# Patient Record
Sex: FEMALE | Race: WHITE | Employment: FULL TIME | URBAN - METROPOLITAN AREA
[De-identification: names, ages, dates, MRNs, and addresses within clinical notes are randomized per-mention and may not be internally consistent; named-entity substitution may affect disease eponyms.]

---

## 2017-10-10 ENCOUNTER — OFFICE VISIT (OUTPATIENT)
Dept: FAMILY MEDICINE | Facility: CLINIC | Age: 52
End: 2017-10-10
Payer: COMMERCIAL

## 2017-10-10 VITALS
DIASTOLIC BLOOD PRESSURE: 73 MMHG | WEIGHT: 127.4 LBS | HEART RATE: 67 BPM | TEMPERATURE: 98 F | BODY MASS INDEX: 23.45 KG/M2 | OXYGEN SATURATION: 99 % | SYSTOLIC BLOOD PRESSURE: 109 MMHG | HEIGHT: 62 IN

## 2017-10-10 DIAGNOSIS — Z12.11 SPECIAL SCREENING FOR MALIGNANT NEOPLASMS, COLON: ICD-10-CM

## 2017-10-10 DIAGNOSIS — Z23 NEED FOR PROPHYLACTIC VACCINATION AND INOCULATION AGAINST INFLUENZA: ICD-10-CM

## 2017-10-10 DIAGNOSIS — Z00.00 ROUTINE GENERAL MEDICAL EXAMINATION AT A HEALTH CARE FACILITY: Primary | ICD-10-CM

## 2017-10-10 LAB — HBA1C MFR BLD: 5.3 % (ref 4.3–6)

## 2017-10-10 PROCEDURE — 99396 PREV VISIT EST AGE 40-64: CPT | Mod: 25 | Performed by: NURSE PRACTITIONER

## 2017-10-10 PROCEDURE — 90688 IIV4 VACCINE SPLT 0.5 ML IM: CPT | Performed by: NURSE PRACTITIONER

## 2017-10-10 PROCEDURE — 83036 HEMOGLOBIN GLYCOSYLATED A1C: CPT | Performed by: NURSE PRACTITIONER

## 2017-10-10 PROCEDURE — 90471 IMMUNIZATION ADMIN: CPT | Performed by: NURSE PRACTITIONER

## 2017-10-10 PROCEDURE — 80061 LIPID PANEL: CPT | Performed by: NURSE PRACTITIONER

## 2017-10-10 PROCEDURE — 36415 COLL VENOUS BLD VENIPUNCTURE: CPT | Performed by: NURSE PRACTITIONER

## 2017-10-10 NOTE — NURSING NOTE
"Chief Complaint   Patient presents with     Physical       Initial /73 (BP Location: Left arm, Patient Position: Chair, Cuff Size: Adult Regular)  Pulse 67  Temp 98  F (36.7  C) (Oral)  Ht 5' 2\" (1.575 m)  Wt 127 lb 6.4 oz (57.8 kg)  SpO2 99%  BMI 23.3 kg/m2 Estimated body mass index is 23.3 kg/(m^2) as calculated from the following:    Height as of this encounter: 5' 2\" (1.575 m).    Weight as of this encounter: 127 lb 6.4 oz (57.8 kg).  Medication Reconciliation: complete     Coby Sykes MA      "

## 2017-10-10 NOTE — PROGRESS NOTES
Injectable Influenza Immunization Documentation    1.  Is the person to be vaccinated sick today?   No    2. Does the person to be vaccinated have an allergy to a component   of the vaccine?   No    3. Has the person to be vaccinated ever had a serious reaction   to influenza vaccine in the past?   No    4. Has the person to be vaccinated ever had Guillain-Barré syndrome?   No    Form completed by Coby Sykes MA

## 2017-10-10 NOTE — MR AVS SNAPSHOT
After Visit Summary   10/10/2017    Nhi Perla    MRN: [id number]           Patient Information     Date Of Birth          1965        Visit Information        Provider Department      10/10/2017 1:40 PM Dayana Jensen APRN [facility]        Today's Diagnoses     Routine general medical examination at a health care facility    -  1    Special screening for malignant neoplasms, colon        Need for prophylactic vaccination and inoculation against influenza          Care Instructions      Preventive Health Recommendations  Female Ages 50 - 64    Yearly exam: See your health care provider every year in order to  o Review health changes.   o Discuss preventive care.    o Review your medicines if your doctor has prescribed any.      Get a Pap test every three years (unless you have an abnormal result and your provider advises testing more often).    If you get Pap tests with HPV test, you only need to test every 5 years, unless you have an abnormal result.     You do not need a Pap test if your uterus was removed (hysterectomy) and you have not had cancer.    You should be tested each year for STDs (sexually transmitted diseases) if you're at risk.     Have a mammogram every 1 to 2 years.    Have a colonoscopy at age 50, or have a yearly FIT test (stool test). These exams screen for colon cancer.      Have a cholesterol test every 5 years, or more often if advised.    Have a diabetes test (fasting glucose) every three years. If you are at risk for diabetes, you should have this test more often.     If you are at risk for osteoporosis (brittle bone disease), think about having a bone density scan (DEXA).    Shots: Get a flu shot each year. Get a tetanus shot every 10 years.    Nutrition:     Eat at least 5 servings of fruits and vegetables each day.    Eat whole-grain bread, whole-wheat pasta and brown rice instead of white grains and rice.    Talk to your provider  about Calcium and Vitamin D.     Lifestyle    Exercise at least 150 minutes a week (30 minutes a day, 5 days a week). This will help you control your weight and prevent disease.    Limit alcohol to one drink per day.    No smoking.     Wear sunscreen to prevent skin cancer.     See your dentist every six months for an exam and cleaning.    See your eye doctor every 1 to 2 years.            Follow-ups after your visit        Future tests that were ordered for you today     Open Future Orders        Priority Expected Expires Ordered    Fecal colorectal cancer screen (FIT) Routine 10/31/2017 1/2/2018 10/10/2017            Who to contact     If you have questions or need follow up information about today's clinic visit or your schedule please contact [facility] directly at [phone].  Normal or non-critical lab and imaging results will be communicated to you by Naurexhart, letter or phone within 4 business days after the clinic has received the results. If you do not hear from us within 7 days, please contact the clinic through Naurexhart or phone. If you have a critical or abnormal lab result, we will notify you by phone as soon as possible.  Submit refill requests through JumpTheClub or call your pharmacy and they will forward the refill request to us. Please allow 3 business days for your refill to be completed.          Additional Information About Your Visit        Naurexhart Information     JumpTheClub gives you secure access to your electronic health record. If you see a primary care provider, you can also send messages to your care team and make appointments. If you have questions, please call your primary care clinic.  If you do not have a primary care provider, please call [phone] and they will assist you.        Care EveryWhere ID     This is your Care EveryWhere ID. This could be used by other organizations to access your [facility] medical records  [id number]        Your Vitals Were     Pulse  "Temperature Height Pulse Oximetry BMI (Body Mass Index)       67 98  F (36.7  C) (Oral) 5' 2\" (1.575 m) 99% 23.3 kg/m2        Blood Pressure from Last 3 Encounters:   10/10/17 109/73   09/29/16 122/73   09/12/16 116/76    Weight from Last 3 Encounters:   10/10/17 127 lb 6.4 oz (57.8 kg)   09/29/16 120 lb (54.4 kg)   09/12/16 121 lb 9.6 oz (55.2 kg)              We Performed the Following     FLU VACCINE, 3 YRS +, IM (QUADRIVALENT W/PRESERVATIVES/MULTI-DOSE) [04769]     Hemoglobin A1c     Lipid panel reflex to direct LDL     Vaccine Administration, Initial [36276]        Primary Care Provider    None Specified       No primary provider on file.        Equal Access to Services     RDONEY HEARD : Rene Rosario, bud magaña, joya cutler, anay person . So [facility] [phone].    ATENCIÓN: Si habla español, tiene a encarnacion disposición servicios gratuitos de asistencia lingüística. Karishma al [phone].    We comply with applicable federal civil rights laws and [place] laws. We do not discriminate on the basis of race, color, national origin, age, disability, sex, sexual orientation, or gender identity.            Thank you!     Thank you for choosing [facility]  for your care. Our goal is always to provide you with excellent care. Hearing back from our patients is one way we can continue to improve our services. Please take a few minutes to complete the written survey that you may receive in the mail after your visit with us. Thank you!             Your Updated Medication List - Protect others around you: Learn how to safely use, store and throw away your medicines at www.disposemymeds.org.          This list is accurate as of: 10/10/17  3:42 PM.  Always use your most recent med list.                   Brand Name Dispense Instructions for use Diagnosis    valACYclovir 1000 mg tablet    VALTREX    20 tablet    Take 1 tablet (1,000 mg) by mouth 2 " times daily    Herpes labialis

## 2017-10-10 NOTE — PROGRESS NOTES
SUBJECTIVE:   CC: Nhi Perla is an 52 year old woman who presents for preventive health visit.     Physical   Annual:     Getting at least 3 servings of Calcium per day::  Yes    Bi-annual eye exam::  Yes    Dental care twice a year::  Yes    Sleep apnea or symptoms of sleep apnea::  None    Diet::  Regular (no restrictions)    Taking medications regularly::  Yes    Medication side effects::  Not applicable    Additional concerns today::  No    Today's PHQ-2 Score:   PHQ-2 ( 1999 Pfizer) 10/10/2017   Q1: Little interest or pleasure in doing things 0   Q2: Feeling down, depressed or hopeless 0   PHQ-2 Score 0   Q1: Little interest or pleasure in doing things Not at all   Q2: Feeling down, depressed or hopeless Not at all   PHQ-2 Score 0       Abuse: Current or Past(Physical, Sexual or Emotional)- No  Do you feel safe in your environment - Yes    Social History   Substance Use Topics     Smoking status: Never Smoker     Smokeless tobacco: Never Used     Alcohol use Yes     The patient does not drink >3 drinks per day nor >7 drinks per week.    Reviewed orders with patient.  Reviewed health maintenance and updated orders accordingly - Yes    Pertinent mammograms are reviewed under the imaging tab.  History of abnormal Pap smear: NO - age 30-65 PAP every 5 years with negative HPV co-testing recommended    Reviewed and updated as needed this visit by clinical staff  Tobacco  Allergies  Meds  Med Hx  Surg Hx  Fam Hx  Soc Hx        Reviewed and updated as needed this visit by Provider  Tobacco  Allergies  Meds  Med Hx  Surg Hx  Fam Hx  Soc Hx           ROS:  C: NEGATIVE for fever, chills, change in weight  I: NEGATIVE for worrisome rashes, moles or lesions  E: NEGATIVE for vision changes or irritation  ENT: NEGATIVE for ear, mouth and throat problems  R: NEGATIVE for significant cough or SOB  B: NEGATIVE for masses, tenderness or discharge  CV: NEGATIVE for chest pain, palpitations or peripheral  "edema  GI: NEGATIVE for nausea, abdominal pain, heartburn, or change in bowel habits  : NEGATIVE for unusual urinary or vaginal symptoms. Periods are regular.  M: NEGATIVE for significant arthralgias or myalgia  N: NEGATIVE for weakness, dizziness or paresthesias  P: NEGATIVE for changes in mood or affect     OBJECTIVE:   /73 (BP Location: Left arm, Patient Position: Chair, Cuff Size: Adult Regular)  Pulse 67  Temp 98  F (36.7  C) (Oral)  Ht 5' 2\" (1.575 m)  Wt 127 lb 6.4 oz (57.8 kg)  SpO2 99%  BMI 23.3 kg/m2  EXAM:  GENERAL APPEARANCE: healthy, alert and no distress  EYES: Eyes grossly normal to inspection, PERRL and conjunctivae and sclerae normal  HENT: ear canals and TM's normal, nose and mouth without ulcers or lesions, oropharynx clear and oral mucous membranes moist  NECK: no adenopathy, no asymmetry, masses, or scars and thyroid normal to palpation  RESP: lungs clear to auscultation - no rales, rhonchi or wheezes  CV: regular rate and rhythm, normal S1 S2, no S3 or S4, no murmur, click or rub, no peripheral edema and peripheral pulses strong  ABDOMEN: soft, nontender, no hepatosplenomegaly, no masses and bowel sounds normal  MS: no musculoskeletal defects are noted and gait is age appropriate without ataxia  SKIN: no suspicious lesions or rashes  PSYCH: mentation appears normal and affect normal/bright    ASSESSMENT/PLAN:       ICD-10-CM    1. Routine general medical examination at a health care facility Z00.00 Lipid panel reflex to direct LDL     Hemoglobin A1c   2. Special screening for malignant neoplasms, colon Z12.11 Fecal colorectal cancer screen (FIT)   3. Need for prophylactic vaccination and inoculation against influenza Z23 FLU VACCINE, 3 YRS +, IM (QUADRIVALENT W/PRESERVATIVES/MULTI-DOSE) [02570]     Vaccine Administration, Initial [42710]       COUNSELING:  Reviewed preventive health counseling, as reflected in patient instructions     reports that she has never smoked. She has " "never used smokeless tobacco.  Estimated body mass index is 23.3 kg/(m^2) as calculated from the following:    Height as of this encounter: 5' 2\" (1.575 m).    Weight as of this encounter: 127 lb 6.4 oz (57.8 kg).         Counseling Resources:  ATP IV Guidelines  Pooled Cohorts Equation Calculator  Breast Cancer Risk Calculator  FRAX Risk Assessment  ICSI Preventive Guidelines  Dietary Guidelines for Americans, 2010  USDA's MyPlate  ASA Prophylaxis  Lung CA Screening    ELSA Amos CNP  [facility] for HPI/ROS submitted by the patient on 10/10/2017   PHQ-2 Score: 0    "

## 2017-10-11 LAB
CHOLEST SERPL-MCNC: 232 MG/DL
HDLC SERPL-MCNC: 112 MG/DL
LDLC SERPL CALC-MCNC: 96 MG/DL
NONHDLC SERPL-MCNC: 120 MG/DL
TRIGL SERPL-MCNC: 121 MG/DL

## 2017-10-16 ENCOUNTER — HOSPITAL ENCOUNTER (OUTPATIENT)
Dept: MAMMOGRAPHY | Facility: HOSPITAL | Age: 52
Discharge: HOME | End: 2017-10-16
Attending: OBSTETRICS & GYNECOLOGY

## 2017-10-16 DIAGNOSIS — Z12.31 VISIT FOR SCREENING MAMMOGRAM: ICD-10-CM

## 2017-11-29 PROCEDURE — 82274 ASSAY TEST FOR BLOOD FECAL: CPT | Performed by: NURSE PRACTITIONER

## 2017-12-02 DIAGNOSIS — Z12.11 SPECIAL SCREENING FOR MALIGNANT NEOPLASMS, COLON: ICD-10-CM

## 2017-12-02 LAB — HEMOCCULT STL QL IA: NEGATIVE

## 2018-05-17 ENCOUNTER — OFFICE VISIT (OUTPATIENT)
Dept: URGENT CARE | Facility: URGENT CARE | Age: 53
End: 2018-05-17
Payer: COMMERCIAL

## 2018-05-17 VITALS
WEIGHT: 122 LBS | OXYGEN SATURATION: 99 % | SYSTOLIC BLOOD PRESSURE: 121 MMHG | HEART RATE: 68 BPM | BODY MASS INDEX: 22.31 KG/M2 | DIASTOLIC BLOOD PRESSURE: 75 MMHG | TEMPERATURE: 98.4 F

## 2018-05-17 DIAGNOSIS — L30.9 DERMATITIS: Primary | ICD-10-CM

## 2018-05-17 PROCEDURE — 99214 OFFICE O/P EST MOD 30 MIN: CPT | Performed by: PHYSICIAN ASSISTANT

## 2018-05-17 RX ORDER — TRIAMCINOLONE ACETONIDE 1 MG/G
CREAM TOPICAL
Qty: 80 G | Refills: 0 | Status: SHIPPED | OUTPATIENT
Start: 2018-05-17 | End: 2023-03-30

## 2018-05-17 RX ORDER — CETIRIZINE HYDROCHLORIDE 10 MG/1
10 TABLET ORAL EVERY EVENING
Qty: 30 TABLET | Refills: 1 | Status: SHIPPED | OUTPATIENT
Start: 2018-05-17 | End: 2023-03-30

## 2018-05-17 NOTE — MR AVS SNAPSHOT
After Visit Summary   5/17/2018    Nhi Perla    MRN: [id number]           Patient Information     Date Of Birth          1965        Visit Information        Provider Department      5/17/2018 6:10 PM Thierry Aguero PA-C [facility]        Today's Diagnoses     Dermatitis    -  1       Follow-ups after your visit        Who to contact     If you have questions or need follow up information about today's clinic visit or your schedule please contact [facility] directly at [phone].  Normal or non-critical lab and imaging results will be communicated to you by Architonichart, letter or phone within 4 business days after the clinic has received the results. If you do not hear from us within 7 days, please contact the clinic through [facility] or phone. If you have a critical or abnormal lab result, we will notify you by phone as soon as possible.  Submit refill requests through Yeti Data or call your pharmacy and they will forward the refill request to us. Please allow 3 business days for your refill to be completed.          Additional Information About Your Visit        MyChart Information     Yeti Data gives you secure access to your electronic health record. If you see a primary care provider, you can also send messages to your care team and make appointments. If you have questions, please call your primary care clinic.  If you do not have a primary care provider, please call [phone] and they will assist you.        Care EveryWhere ID     This is your Care EveryWhere ID. This could be used by other organizations to access your [facility] medical records  [id number]        Your Vitals Were     Pulse Temperature Pulse Oximetry BMI (Body Mass Index)          68 98.4  F (36.9  C) (Tympanic) 99% 22.31 kg/m2         Blood Pressure from Last 3 Encounters:   05/17/18 121/75   10/10/17 109/73   09/29/16 122/73    Weight from Last 3 Encounters:   05/17/18 122  lb (55.3 kg)   10/10/17 127 lb 6.4 oz (57.8 kg)   09/29/16 120 lb (54.4 kg)              Today, you had the following     No orders found for display         Today's Medication Changes          These changes are accurate as of 5/17/18  7:08 PM.  If you have any questions, ask your nurse or doctor.               Start taking these medicines.        Dose/Directions    cetirizine 10 MG tablet   Commonly known as:  zyrTEC   Used for:  Dermatitis   Started by:  Thierry Aguero PA-C        Dose:  10 mg   Take 1 tablet (10 mg) by mouth every evening   Quantity:  30 tablet   Refills:  1       triamcinolone 0.1 % cream   Commonly known as:  KENALOG   Used for:  Dermatitis   Started by:  Thierry Aguero PA-C        Apply sparingly to affected area three times daily as needed   Quantity:  80 g   Refills:  0            Where to get your medicines      These medications were sent to [id number] - [place], [place] - [address]  [address], [place] [address]     Phone:  [phone]     cetirizine 10 MG tablet    triamcinolone 0.1 % cream                Primary Care Provider Office Phone # Fax #    Dayana Kuo ELSA Jensen [facility] [phone] [address] [facility] [address] [address]        Equal Access to Services     RODNEY HEARD AH: Hadii epifanio ku hadasho Soomaali, waaxda luqadaha, qaybta kaalmada adeegyada, anay cui. So [facility] [place] [phone].    ATENCIÓN: Si habla español, tiene a encarnacion disposición servicios gratuitos de asistencia lingüística. Karishma al [phone].    We comply with applicable federal civil rights laws and [place] laws. We do not discriminate on the basis of race, color, national origin, age, disability, sex, sexual orientation, or gender identity.            Thank you!     Thank you for choosing [facility]  for your care. Our goal is always to provide you with excellent care. Hearing back from  our patients is one way we can continue to improve our services. Please take a few minutes to complete the written survey that you may receive in the mail after your visit with us. Thank you!             Your Updated Medication List - Protect others around you: Learn how to safely use, store and throw away your medicines at www.disposemymeds.org.          This list is accurate as of 5/17/18  7:08 PM.  Always use your most recent med list.                   Brand Name Dispense Instructions for use Diagnosis    cetirizine 10 MG tablet    zyrTEC    30 tablet    Take 1 tablet (10 mg) by mouth every evening    Dermatitis       MULTIVITAMIN GUMMIES WOMENS PO           triamcinolone 0.1 % cream    KENALOG    80 g    Apply sparingly to affected area three times daily as needed    Dermatitis       valACYclovir 1000 mg tablet    VALTREX    20 tablet    Take 1 tablet (1,000 mg) by mouth 2 times daily    Herpes labialis

## 2018-05-17 NOTE — PROGRESS NOTES
SUBJECTIVE:  Nhi Perla is a 52 year old female who presents to the clinic today for a rash.  Onset of rash was 3 week(s) ago.   Rash is worsening and intermittent episodes lasting  1  week(s).  Location of the rash: chest today, neck 1 week ago and left hip 1 week ago. She did see her dermatologist 3 weeks ago for forehead rash that was similar. HC 1% used and it did resolve.  Quality/symptoms of rash: itching and red   Symptoms are moderate and rash seems to be worsening.  Previous history of a similar rash? No  Recent exposure history: none known    Associated symptoms include: nothing.    No past medical history on file.  Current Outpatient Prescriptions   Medication Sig Dispense Refill     Multiple Vitamins-Minerals (MULTIVITAMIN GUMMIES WOMENS PO)        valACYclovir (VALTREX) 1000 mg tablet Take 1 tablet (1,000 mg) by mouth 2 times daily (Patient not taking: Reported on 5/17/2018) 20 tablet 0     Social History   Substance Use Topics     Smoking status: Never Smoker     Smokeless tobacco: Never Used     Alcohol use Yes       ROS:  CONSTITUTIONAL:NEGATIVE for fever, chills, change in weight  INTEGUMENTARY/SKIN: POSITIVE for rash neck    EXAM:   /75  Pulse 68  Temp 98.4  F (36.9  C) (Tympanic)  Wt 122 lb (55.3 kg)  SpO2 99%  BMI 22.31 kg/m2  GENERAL: alert, no acute distress.  SKIN: Rash description:    Distribution: localized  Location: chest upper right, lower leg left and neck left   Color: red and skin color,  Lesion type: maculopapular, confluent with excoriation  GENERAL APPEARANCE: healthy, alert and no distress    ASSESSMENT:    1. Dermatitis  R/O contact  - cetirizine (ZYRTEC) 10 MG tablet; Take 1 tablet (10 mg) by mouth every evening  Dispense: 30 tablet; Refill: 1  - triamcinolone (KENALOG) 0.1 % cream; Apply sparingly to affected area three times daily as needed  Dispense: 80 g; Refill: 0    PLAN: also diphenhydramine at night 25 mg.   1) See today's orders.  2) Follow-up with  dermatologist next week.

## 2019-08-12 ENCOUNTER — OFFICE VISIT (OUTPATIENT)
Dept: FAMILY MEDICINE | Facility: CLINIC | Age: 54
End: 2019-08-12
Payer: COMMERCIAL

## 2019-08-12 VITALS
BODY MASS INDEX: 22.68 KG/M2 | SYSTOLIC BLOOD PRESSURE: 96 MMHG | HEART RATE: 67 BPM | TEMPERATURE: 97.9 F | RESPIRATION RATE: 18 BRPM | WEIGHT: 124 LBS | DIASTOLIC BLOOD PRESSURE: 60 MMHG | OXYGEN SATURATION: 100 %

## 2019-08-12 DIAGNOSIS — R05.9 COUGH: Primary | ICD-10-CM

## 2019-08-12 PROCEDURE — 99213 OFFICE O/P EST LOW 20 MIN: CPT | Performed by: NURSE PRACTITIONER

## 2019-08-12 RX ORDER — AZITHROMYCIN 250 MG/1
TABLET, FILM COATED ORAL
Qty: 6 TABLET | Refills: 0 | Status: SHIPPED | OUTPATIENT
Start: 2019-08-12 | End: 2023-03-30

## 2019-08-12 NOTE — PROGRESS NOTES
Subjective     Nhi Perla is a 54 year old female who presents to clinic today for the following health issues:    HPI   RESPIRATORY SYMPTOMS      Duration: x2-3 months     Description  cough    Severity: mild    Accompanying signs and symptoms: worse at night and morning. Coughing fits randomly during the day      History (predisposing factors):  none    Precipitating or alleviating factors: None    Therapies tried and outcome:  Zyrtec and Calritan  Outcome not effective     Cough persists for 2-3 months.  Nonproductive.    Random during day more persistent at night  Was told to try antihistamine and zyrtec does not seem to change the cough.    Denies fevers or chills.    Has not tried antibiotics  OTC cough meds have not given any extended relief.      Reviewed and updated as needed this visit by Provider  Tobacco  Allergies  Meds  Problems  Med Hx  Surg Hx  Fam Hx         Review of Systems   ROS COMP: Constitutional, HEENT, cardiovascular, pulmonary, GI, , musculoskeletal, neuro, skin, endocrine and psych systems are negative, except as otherwise noted.      Objective    BP 96/60 (BP Location: Right arm, Patient Position: Sitting, Cuff Size: Adult Regular)   Pulse 67   Temp 97.9  F (36.6  C) (Oral)   Resp 18   Wt 56.2 kg (124 lb)   SpO2 100%   BMI 22.68 kg/m    Body mass index is 22.68 kg/m .  Physical Exam   GENERAL: healthy, alert and no distress  EYES: Eyes grossly normal to inspection, PERRL and conjunctivae and sclerae normal  HENT: ear canals and TM's normal, nose and mouth without ulcers or lesions  NECK: no adenopathy, no asymmetry, masses, or scars and thyroid normal to palpation  RESP: lungs clear to auscultation - no rales, rhonchi or wheezes  CV: regular rate and rhythm, normal S1 S2, no S3 or S4, no murmur, click or rub, no peripheral edema and peripheral pulses strong  MS: no gross musculoskeletal defects noted, no edema  SKIN: no suspicious lesions or rashes          Assessment &  Plan       ICD-10-CM    1. Cough R05 azithromycin (ZITHROMAX) 250 MG tablet     albuterol (PROAIR RESPICLICK) 108 (90 Base) MCG/ACT inhaler     Will trial zpak and proair.  If ineffective may consider labs and XR.         Return in about 1 week (around 8/19/2019), or if symptoms worsen or fail to improve.    ELSA Amos CNP  [facility]

## 2019-11-12 ENCOUNTER — TRANSFERRED RECORDS (OUTPATIENT)
Dept: HEALTH INFORMATION MANAGEMENT | Facility: CLINIC | Age: 54
End: 2019-11-12

## 2020-03-01 ENCOUNTER — HEALTH MAINTENANCE LETTER (OUTPATIENT)
Age: 55
End: 2020-03-01

## 2020-12-14 ENCOUNTER — HEALTH MAINTENANCE LETTER (OUTPATIENT)
Age: 55
End: 2020-12-14

## 2021-04-17 ENCOUNTER — HEALTH MAINTENANCE LETTER (OUTPATIENT)
Age: 56
End: 2021-04-17

## 2021-04-22 ENCOUNTER — IMMUNIZATION (OUTPATIENT)
Dept: NURSING | Facility: CLINIC | Age: 56
End: 2021-04-22
Payer: COMMERCIAL

## 2021-04-22 PROCEDURE — 91300 PR COVID VAC PFIZER DIL RECON 30 MCG/0.3 ML IM: CPT

## 2021-04-22 PROCEDURE — 0001A PR COVID VAC PFIZER DIL RECON 30 MCG/0.3 ML IM: CPT

## 2021-05-13 ENCOUNTER — IMMUNIZATION (OUTPATIENT)
Dept: NURSING | Facility: CLINIC | Age: 56
End: 2021-05-13
Attending: INTERNAL MEDICINE
Payer: COMMERCIAL

## 2021-05-13 PROCEDURE — 0002A PR COVID VAC PFIZER DIL RECON 30 MCG/0.3 ML IM: CPT

## 2021-05-13 PROCEDURE — 91300 PR COVID VAC PFIZER DIL RECON 30 MCG/0.3 ML IM: CPT

## 2021-10-02 ENCOUNTER — HEALTH MAINTENANCE LETTER (OUTPATIENT)
Age: 56
End: 2021-10-02

## 2022-01-17 ENCOUNTER — OFFICE VISIT (OUTPATIENT)
Dept: FAMILY MEDICINE | Facility: CLINIC | Age: 57
End: 2022-01-17
Payer: COMMERCIAL

## 2022-01-17 VITALS
DIASTOLIC BLOOD PRESSURE: 70 MMHG | WEIGHT: 127 LBS | HEART RATE: 64 BPM | HEIGHT: 62 IN | TEMPERATURE: 98.4 F | BODY MASS INDEX: 23.37 KG/M2 | RESPIRATION RATE: 16 BRPM | SYSTOLIC BLOOD PRESSURE: 108 MMHG | OXYGEN SATURATION: 98 %

## 2022-01-17 DIAGNOSIS — J40 BRONCHITIS: Primary | ICD-10-CM

## 2022-01-17 PROCEDURE — 99213 OFFICE O/P EST LOW 20 MIN: CPT | Performed by: NURSE PRACTITIONER

## 2022-01-17 RX ORDER — ALBUTEROL SULFATE 90 UG/1
2 AEROSOL, METERED RESPIRATORY (INHALATION) EVERY 6 HOURS
Qty: 18 G | Refills: 0 | Status: SHIPPED | OUTPATIENT
Start: 2022-01-17 | End: 2023-03-30

## 2022-01-17 RX ORDER — PREDNISONE 20 MG/1
20 TABLET ORAL DAILY
Qty: 10 TABLET | Refills: 0 | Status: SHIPPED | OUTPATIENT
Start: 2022-01-17 | End: 2023-03-30

## 2022-01-17 RX ORDER — BENZONATATE 200 MG/1
200 CAPSULE ORAL 3 TIMES DAILY PRN
Qty: 30 CAPSULE | Refills: 0 | Status: SHIPPED | OUTPATIENT
Start: 2022-01-17 | End: 2023-03-30

## 2022-01-17 ASSESSMENT — MIFFLIN-ST. JEOR: SCORE: 1124.32

## 2022-01-17 NOTE — PROGRESS NOTES
Assessment & Plan     Bronchitis  Symptoms most consistent with acute bronchitis.  Will try short course of steroids and inhaler, cough medication prescribed.   - benzonatate (TESSALON) 200 MG capsule; Take 1 capsule (200 mg) by mouth 3 times daily as needed for cough  - albuterol (PROAIR HFA/PROVENTIL HFA/VENTOLIN HFA) 108 (90 Base) MCG/ACT inhaler; Inhale 2 puffs into the lungs every 6 hours  - predniSONE (DELTASONE) 20 MG tablet; Take 1 tablet (20 mg) by mouth daily        Return for Follow up if symptoms worsen or fail to improve, Follow up, Routine preventive, in person.    ELSA [facility]    Adrienne Hankins is a 56 year old who presents for the following health issues     HPI     Acute Illness  Acute illness concerns: cough that can be uncontrollable   Onset/Duration: November 2021  Symptoms:  Fever: no  Chills/Sweats: no  Headache (location?): no  Sinus Pressure: no  Conjunctivitis:  no  Ear Pain: no  Rhinorrhea: no  Congestion: no  Sore Throat: no  Cough: YES-non-productive, productive of clear sputum, barking  Wheeze: YES  Decreased Appetite: no  Nausea: no  Vomiting: no  Diarrhea: no  Dysuria/Freq.: no  Dysuria or Hematuria: no  Fatigue/Achiness: no  Sick/Strep Exposure: no  Therapies tried and outcome: nyquil and cough drops and medicine somewhat helps    Everyday, having hacking coughing fits.  Has taking OTC cough suppressants.  Bad cold cough in Nov.  Dry cough.  Lungs hurt after coughing fits.     Review of Systems   Constitutional, HEENT, cardiovascular, pulmonary, gi and gu systems are negative, except as otherwise noted.      Objective    There were no vitals taken for this visit.  There is no height or weight on file to calculate BMI.  Physical Exam   GENERAL: healthy, alert and no distress  EYES: Eyes grossly normal to inspection, PERRL and conjunctivae and sclerae normal  HENT: ear canals and TM's normal, nose and mouth without ulcers or  lesions  NECK: no adenopathy, no asymmetry, masses, or scars and thyroid normal to palpation  RESP: lungs clear to auscultation - no rales, rhonchi or wheezes  CV: regular rate and rhythm, normal S1 S2, no S3 or S4, no murmur, click or rub, no peripheral edema and peripheral pulses strong  MS: no gross musculoskeletal defects noted, no edema

## 2022-02-11 ENCOUNTER — DOCUMENTATION ONLY (OUTPATIENT)
Dept: LAB | Facility: CLINIC | Age: 57
End: 2022-02-11
Payer: COMMERCIAL

## 2022-02-11 NOTE — PROGRESS NOTES
Please note, never seen her  Will order labs in person at apt with me which is my preference as gives me opportunity to discuss hx and order appropriately

## 2022-02-11 NOTE — PROGRESS NOTES
Nhi Perla has an upcoming lab appointment:    Future Appointments   Date Time Provider Department Center   3/3/2022  8:15 AM [facility]    3/7/2022  3:10 PM Little Muir MD [facility]     Patient is scheduled for the following lab(s): PER PATIENT APPOINTMENT NOTES, PHYSICAL LABS    There is no order available. Please review and place either future orders or HMPO (Review of Health Maintenance Protocol Orders), as appropriate.    Health Maintenance Due   Topic     ANNUAL REVIEW OF HM ORDERS      HIV SCREENING      HEPATITIS C SCREENING      Valarie Colón

## 2022-02-22 NOTE — PROGRESS NOTES
NexPlanart message sent to patient to cancel her 3/3/22 lab appt as labs will be drawn at 3/7/22 appointment with Dr. Muir.    Valarie Upton RN  [facility]

## 2022-05-14 ENCOUNTER — HEALTH MAINTENANCE LETTER (OUTPATIENT)
Age: 57
End: 2022-05-14

## 2023-01-14 ENCOUNTER — HEALTH MAINTENANCE LETTER (OUTPATIENT)
Age: 58
End: 2023-01-14

## 2023-03-30 ENCOUNTER — OFFICE VISIT (OUTPATIENT)
Dept: FAMILY MEDICINE | Facility: CLINIC | Age: 58
End: 2023-03-30
Payer: COMMERCIAL

## 2023-03-30 VITALS
SYSTOLIC BLOOD PRESSURE: 102 MMHG | WEIGHT: 128.8 LBS | DIASTOLIC BLOOD PRESSURE: 70 MMHG | HEIGHT: 62 IN | HEART RATE: 75 BPM | OXYGEN SATURATION: 98 % | BODY MASS INDEX: 23.7 KG/M2 | RESPIRATION RATE: 17 BRPM | TEMPERATURE: 98.5 F

## 2023-03-30 DIAGNOSIS — Z87.442 HISTORY OF KIDNEY STONES: ICD-10-CM

## 2023-03-30 DIAGNOSIS — Z13.29 SCREENING FOR THYROID DISORDER: ICD-10-CM

## 2023-03-30 DIAGNOSIS — R06.02 SHORTNESS OF BREATH: ICD-10-CM

## 2023-03-30 DIAGNOSIS — Z00.00 ROUTINE HISTORY AND PHYSICAL EXAMINATION OF ADULT: Primary | ICD-10-CM

## 2023-03-30 DIAGNOSIS — Z71.89 ADVANCED DIRECTIVES, COUNSELING/DISCUSSION: ICD-10-CM

## 2023-03-30 DIAGNOSIS — Z23 NEED FOR HEPATITIS B VACCINATION: ICD-10-CM

## 2023-03-30 DIAGNOSIS — Z85.828 HISTORY OF BASAL CELL CARCINOMA: ICD-10-CM

## 2023-03-30 DIAGNOSIS — Z12.11 COLON CANCER SCREENING: ICD-10-CM

## 2023-03-30 DIAGNOSIS — Z00.00 HEALTH CARE MAINTENANCE: ICD-10-CM

## 2023-03-30 DIAGNOSIS — Z13.0 SCREENING, ANEMIA, DEFICIENCY, IRON: ICD-10-CM

## 2023-03-30 DIAGNOSIS — K57.30 DIVERTICULOSIS OF LARGE INTESTINE WITHOUT HEMORRHAGE: ICD-10-CM

## 2023-03-30 DIAGNOSIS — Z23 NEED FOR PROPHYLACTIC VACCINATION AND INOCULATION AGAINST INFLUENZA: ICD-10-CM

## 2023-03-30 DIAGNOSIS — Z80.3 FAMILY HISTORY OF MALIGNANT NEOPLASM OF BREAST: ICD-10-CM

## 2023-03-30 DIAGNOSIS — Z53.20 HIV SCREENING DECLINED: ICD-10-CM

## 2023-03-30 DIAGNOSIS — Z87.891 FORMER SMOKER: ICD-10-CM

## 2023-03-30 DIAGNOSIS — Z11.59 NEED FOR HEPATITIS C SCREENING TEST: ICD-10-CM

## 2023-03-30 DIAGNOSIS — K64.9 HEMORRHOIDS, UNSPECIFIED HEMORRHOID TYPE: ICD-10-CM

## 2023-03-30 DIAGNOSIS — Z23 NEED FOR SHINGLES VACCINE: ICD-10-CM

## 2023-03-30 DIAGNOSIS — Z82.49 FAMILY HISTORY OF ISCHEMIC HEART DISEASE: ICD-10-CM

## 2023-03-30 DIAGNOSIS — Z13.1 SCREENING FOR DIABETES MELLITUS: ICD-10-CM

## 2023-03-30 DIAGNOSIS — Z82.62 FAMILY HISTORY OF OSTEOPOROSIS: ICD-10-CM

## 2023-03-30 DIAGNOSIS — Z13.220 ENCOUNTER FOR LIPID SCREENING FOR CARDIOVASCULAR DISEASE: ICD-10-CM

## 2023-03-30 DIAGNOSIS — Z23 NEED FOR COVID-19 VACCINE: ICD-10-CM

## 2023-03-30 DIAGNOSIS — D22.9 MULTIPLE PIGMENTED NEVI: ICD-10-CM

## 2023-03-30 DIAGNOSIS — Z86.018 HISTORY OF UTERINE FIBROID: ICD-10-CM

## 2023-03-30 DIAGNOSIS — Z79.890 HORMONE REPLACEMENT THERAPY (POSTMENOPAUSAL): ICD-10-CM

## 2023-03-30 DIAGNOSIS — Z13.6 ENCOUNTER FOR LIPID SCREENING FOR CARDIOVASCULAR DISEASE: ICD-10-CM

## 2023-03-30 PROBLEM — N93.8 DYSFUNCTIONAL UTERINE BLEEDING: Status: ACTIVE | Noted: 2023-03-30

## 2023-03-30 PROBLEM — N93.8 DYSFUNCTIONAL UTERINE BLEEDING: Status: RESOLVED | Noted: 2023-03-30 | Resolved: 2023-03-30

## 2023-03-30 LAB
ALBUMIN SERPL BCG-MCNC: 4.2 G/DL (ref 3.5–5.2)
ALP SERPL-CCNC: 51 U/L (ref 35–104)
ALT SERPL W P-5'-P-CCNC: 11 U/L (ref 10–35)
ANION GAP SERPL CALCULATED.3IONS-SCNC: 11 MMOL/L (ref 7–15)
AST SERPL W P-5'-P-CCNC: 24 U/L (ref 10–35)
BASOPHILS # BLD AUTO: 0 10E3/UL (ref 0–0.2)
BASOPHILS NFR BLD AUTO: 1 %
BILIRUB SERPL-MCNC: 0.6 MG/DL
BUN SERPL-MCNC: 15.8 MG/DL (ref 6–20)
CALCIUM SERPL-MCNC: 8.9 MG/DL (ref 8.6–10)
CHLORIDE SERPL-SCNC: 105 MMOL/L (ref 98–107)
CHOLEST SERPL-MCNC: 241 MG/DL
CREAT SERPL-MCNC: 0.71 MG/DL (ref 0.51–0.95)
DEPRECATED HCO3 PLAS-SCNC: 24 MMOL/L (ref 22–29)
EOSINOPHIL # BLD AUTO: 0.1 10E3/UL (ref 0–0.7)
EOSINOPHIL NFR BLD AUTO: 3 %
ERYTHROCYTE [DISTWIDTH] IN BLOOD BY AUTOMATED COUNT: 12.1 % (ref 10–15)
GFR SERPL CREATININE-BSD FRML MDRD: >90 ML/MIN/1.73M2
GLUCOSE SERPL-MCNC: 87 MG/DL (ref 70–99)
HBA1C MFR BLD: 5.6 % (ref 0–5.6)
HCT VFR BLD AUTO: 38.6 % (ref 35–47)
HCV AB SERPL QL IA: NONREACTIVE
HDLC SERPL-MCNC: 92 MG/DL
HGB BLD-MCNC: 12.8 G/DL (ref 11.7–15.7)
IMM GRANULOCYTES # BLD: 0 10E3/UL
IMM GRANULOCYTES NFR BLD: 0 %
LDLC SERPL CALC-MCNC: 136 MG/DL
LYMPHOCYTES # BLD AUTO: 1.4 10E3/UL (ref 0.8–5.3)
LYMPHOCYTES NFR BLD AUTO: 33 %
MCH RBC QN AUTO: 33.4 PG (ref 26.5–33)
MCHC RBC AUTO-ENTMCNC: 33.2 G/DL (ref 31.5–36.5)
MCV RBC AUTO: 101 FL (ref 78–100)
MONOCYTES # BLD AUTO: 0.4 10E3/UL (ref 0–1.3)
MONOCYTES NFR BLD AUTO: 10 %
NEUTROPHILS # BLD AUTO: 2.3 10E3/UL (ref 1.6–8.3)
NEUTROPHILS NFR BLD AUTO: 53 %
NONHDLC SERPL-MCNC: 149 MG/DL
PLATELET # BLD AUTO: 338 10E3/UL (ref 150–450)
POTASSIUM SERPL-SCNC: 3.8 MMOL/L (ref 3.4–5.3)
PROT SERPL-MCNC: 6.9 G/DL (ref 6.4–8.3)
RBC # BLD AUTO: 3.83 10E6/UL (ref 3.8–5.2)
SODIUM SERPL-SCNC: 140 MMOL/L (ref 136–145)
TRIGL SERPL-MCNC: 65 MG/DL
TSH SERPL DL<=0.005 MIU/L-ACNC: 2.18 UIU/ML (ref 0.3–4.2)
WBC # BLD AUTO: 4.3 10E3/UL (ref 4–11)

## 2023-03-30 PROCEDURE — 80061 LIPID PANEL: CPT | Performed by: FAMILY MEDICINE

## 2023-03-30 PROCEDURE — 99396 PREV VISIT EST AGE 40-64: CPT | Mod: 25 | Performed by: FAMILY MEDICINE

## 2023-03-30 PROCEDURE — 80050 GENERAL HEALTH PANEL: CPT | Performed by: FAMILY MEDICINE

## 2023-03-30 PROCEDURE — 36415 COLL VENOUS BLD VENIPUNCTURE: CPT | Performed by: FAMILY MEDICINE

## 2023-03-30 PROCEDURE — 86803 HEPATITIS C AB TEST: CPT | Performed by: FAMILY MEDICINE

## 2023-03-30 PROCEDURE — 83036 HEMOGLOBIN GLYCOSYLATED A1C: CPT | Performed by: FAMILY MEDICINE

## 2023-03-30 PROCEDURE — 99213 OFFICE O/P EST LOW 20 MIN: CPT | Mod: 25 | Performed by: FAMILY MEDICINE

## 2023-03-30 RX ORDER — PROGESTERONE 100 MG/1
100 CAPSULE ORAL AT BEDTIME
COMMUNITY
Start: 2023-02-13

## 2023-03-30 RX ORDER — ESTRADIOL 0.04 MG/D
PATCH, EXTENDED RELEASE TRANSDERMAL
COMMUNITY
Start: 2023-03-13

## 2023-03-30 ASSESSMENT — ENCOUNTER SYMPTOMS
FREQUENCY: 0
PARESTHESIAS: 0
ABDOMINAL PAIN: 0
EYE PAIN: 0
SORE THROAT: 0
CONSTIPATION: 0
PALPITATIONS: 0
HEARTBURN: 0
BREAST MASS: 0
WEAKNESS: 0
NAUSEA: 0
JOINT SWELLING: 0
HEADACHES: 0
SHORTNESS OF BREATH: 0
COUGH: 0
ARTHRALGIAS: 0
NERVOUS/ANXIOUS: 0
DIARRHEA: 0
CHILLS: 0
DYSURIA: 0
MYALGIAS: 0
DIZZINESS: 0
HEMATURIA: 0
HEMATOCHEZIA: 0
FEVER: 0

## 2023-03-30 NOTE — PROGRESS NOTES
SUBJECTIVE:   CC: Nhi is an 57 year old who presents for preventive health visit.   No flowsheet data found.Patient has been advised of split billing requirements and indicates understanding: Yes  Healthy Habits:     Getting at least 3 servings of Calcium per day:  Yes    Bi-annual eye exam:  Yes    Dental care twice a year:  Yes    Sleep apnea or symptoms of sleep apnea:  None    Diet:  Regular (no restrictions)    Frequency of exercise:  4-5 days/week    Duration of exercise:  45-60 minutes    Taking medications regularly:  Yes    PHQ-2 Total Score: 0    Additional concerns today:  Yes    57-year-old lady,  Former smoker ,with history of allergy to penicillin, history of DU B, on Vivelle-Dot and Prometrium by an outside provider, seen only for acute care previously last by Adelita Barragan 1/17/2022 for bronchitis and treated with Tessalon prednisone and azithromycin.  Colonoscopy last done 2019 showed diverticulosis hemorrhoids polyps removed were benign tissue and recheck due in 2029,    Here today for a preventive physical.  New to this provider.    On hrt by NP  Post menopausal 2.5 yrs  On HRT on Vivelle patch &  Prometrium cap daily since fall 2022 : emotional , couldn't sleep, incontinence with exercise improved, seen by nurse pract [facility] . Regular ob is Dr Benton  To get mammogram this yr with then  Pap done 1 yr ago was reported  normal  Fh of breast cancer  Works with ob on this  Opted out genetic consult, one aunt was in 60's overweight other aunt was a heavy smoker and drinker   No breast concerns    Gets shortness of breath noted, not last long, sometimes in am, usually when relaxed, like sleeping and get up, or start of exercise notices as well  Exercise a lot , does [place] therapy twice a week and tennis 3 times a week with no shortness of breath or chest pain or palpitations through activity.  Tries to eat healthy, No processed foods  Parents both had heart disease, see if can do a heart  screening. Dad had a triple bypass age 58, paternal hx of strokes and heart disease and mom had a heart attack age 80  Thinks stress induced with mom  Former smoker quit 2000 0.25 cig a day 16 yrs    Colonoscopy due 2029  asymptomatic diverticulosis and hemorrhoids    Hx of kidney stones unknown, last time in [facility], 10 yrs ago, unknown kind    Sister osteoporosis  dexa scan done 3 yrs ago told fine,   [employer], 2 / week  And tennis 3/ wk , walks    Hx of uterine fibroid s/p surgery     Multiple nevi, hx of bccc face s/p mohs, follows yearly with derm consultants     reviewed  Has ACP at home  Opted out flu shot this flu season  Opt out COVID booster, had 3 primary, not like lot of med  Discussed hep B vaccination, will think about it   Consider Shingrex. shingles vaccines a series of 2 shots 2 to 6 months apart that can cause couple days of flu like symptoms but is expensive so to check with insurance and get at pharmacy if cheaper there.  Labs today   Same partner since 1998  Did IVF in past   Tested in past , declines HIV  Feels not at risk for HIV  Will do hep C    Today's PHQ-2 Score:   PHQ-2 ( 1999 Pfizer) 3/30/2023   Q1: Little interest or pleasure in doing things 0   Q2: Feeling down, depressed or hopeless 0   PHQ-2 Score 0   Q1: Little interest or pleasure in doing things Not at all   Q2: Feeling down, depressed or hopeless Not at all   PHQ-2 Score 0     Social History     Tobacco Use     Smoking status: Never     Smokeless tobacco: Never   Substance Use Topics     Alcohol use: Yes       Alcohol Use 3/30/2023   Prescreen: >3 drinks/day or >7 drinks/week? No     Reviewed orders with patient.  Reviewed health maintenance and updated orders accordingly - Yes  Lab work is in process  Labs reviewed in EPIC  BP Readings from Last 3 Encounters:   03/30/23 102/70   01/17/22 108/70   08/12/19 96/60    Wt Readings from Last 3 Encounters:   03/30/23 58.4 kg (128 lb 12.8 oz)   01/17/22 57.6 kg (127 lb)    19 56.2 kg (124 lb)          Patient Active Problem List   Diagnosis     Diverticulosis of large intestine without hemorrhage     Hemorrhoids, unspecified hemorrhoid type     Family history of osteoporosis     Former smoker     Family history of ischemic heart disease     Hormone replacement therapy (postmenopausal)     History of kidney stones     Past Surgical History:   Procedure Laterality Date     AS KNEE SCOPE,MED/LAT MENISCUS REPAIR Right 2008    meniscus     ROTATOR CUFF REPAIR RT/LT Right 2013     UTERINE FIBROID SURGERY         Social History     Tobacco Use     Smoking status: Former     Packs/day: 0.25     Years: 16.00     Pack years: 4.00     Types: Cigarettes     Start date: 1984     Quit date: 2000     Years since quittin.2     Smokeless tobacco: Never   Substance Use Topics     Alcohol use: Yes     Comment: about 3 to 4 glasses a week     Family History   Problem Relation Age of Onset     Heart Disease Mother          80 mi     Heart Disease Father      CABG Father 58     Hyperlipidemia Father      Osteoporosis Sister      Cerebrovascular Disease Paternal Grandmother      Heart Disease Paternal Grandmother      Breast Cancer Maternal Aunt      Breast Cancer Paternal Aunt          Current Outpatient Medications   Medication Sig Dispense Refill     estradiol (VIVELLE-DOT) 0.0375 MG/24HR BIW patch APPLY 1 PATCH TRANSDERMALLY 2 TIMES WEEKLY       Multiple Vitamins-Minerals (MULTIVITAMIN ADULTS PO)        progesterone (PROMETRIUM) 100 MG capsule Take 100 mg by mouth At Bedtime       Allergies   Allergen Reactions     Pcn [Penicillin G]      hives     Recent Labs   Lab Test 10/10/17  1422   A1C 5.3   LDL 96      TRIG 121        Breast Cancer Screening:    Breast CA Risk Assessment (FHS-7) 3/30/2023   Do you have a family history of breast, colon, or ovarian cancer? No / Unknown     Mammogram Screening: Recommended mammography every 1-2 years with patient discussion and risk  factor consideration  Pertinent mammograms are reviewed under the imaging tab.    History of abnormal Pap smear:   NO - age 30-65 PAP every 5 years with negative HPV co-testing recommended  Last 3 Pap and HPV Results:   PAP / HPV Latest Ref Rng & Units 2016   PAP (Historical) Negative Negative     PAP / HPV Latest Ref Rng & Units 2016   PAP (Historical) Negative Negative     Reviewed and updated as needed this visit by clinical staff    Allergies  Meds  Problems  Med Hx            Reviewed and updated as needed this visit by Provider    Allergies  Meds  Problems  Med Hx           Past Medical History:   Diagnosis Date     CARDIOVASCULAR SCREENING; LDL GOAL LESS THAN 160 2013     Dysfunctional uterine bleeding 2023     Encounter for artificial insemination     with own partner     History of in vitro fertilization     x 1     History of kidney stones       Past Surgical History:   Procedure Laterality Date     AS KNEE SCOPE,MED/LAT MENISCUS REPAIR Right 2008    meniscus     ROTATOR CUFF REPAIR RT/LT Right 2013     UTERINE FIBROID SURGERY       OB History    Para Term  AB Living   0 0 0 0 0 0   SAB IAB Ectopic Multiple Live Births   0 0 0 0 0       Review of Systems   Constitutional: Negative for chills and fever.   HENT: Negative for congestion, ear pain, hearing loss and sore throat.    Eyes: Negative for pain and visual disturbance.   Respiratory: Negative for cough and shortness of breath.    Cardiovascular: Negative for chest pain, palpitations and peripheral edema.   Gastrointestinal: Negative for abdominal pain, constipation, diarrhea, heartburn, hematochezia and nausea.   Breasts:  Negative for tenderness, breast mass and discharge.   Genitourinary: Negative for dysuria, frequency, genital sores, hematuria, pelvic pain, urgency, vaginal bleeding and vaginal discharge.   Musculoskeletal: Negative for arthralgias, joint swelling and myalgias.   Skin: Negative for rash.  "  Neurological: Negative for dizziness, weakness, headaches and paresthesias.   Psychiatric/Behavioral: Negative for mood changes. The patient is not nervous/anxious.       OBJECTIVE:   /70 (BP Location: Right arm, Patient Position: Sitting, Cuff Size: Adult Regular)   Pulse 75   Temp 98.5  F (36.9  C) (Temporal)   Resp 17   Ht 1.585 m (5' 2.4\")   Wt 58.4 kg (128 lb 12.8 oz)   SpO2 98%   BMI 23.26 kg/m    Physical Exam  GENERAL: healthy, alert and no distress  EYES: Eyes grossly normal to inspection, PERRL and conjunctivae and sclerae normal  HENT: ear canals and TM's normal, nose and mouth without ulcers or lesions  NECK: no adenopathy, no asymmetry, masses, or scars and thyroid normal to palpation  RESP: lungs clear to auscultation - no rales, rhonchi or wheezes  CV: regular rate and rhythm, normal S1 S2, no S3 or S4, no murmur, click or rub, no peripheral edema and peripheral pulses strong  ABDOMEN: soft, non tender, no hepatosplenomegaly, no masses and bowel sounds normal  MS: no gross musculoskeletal defects noted, no edema  SKIN: multiple pigmented nevi  NEURO: Normal strength and tone, mentation intact and speech normal  PSYCH: mentation appears normal, affect normal/bright    Diagnostic Test Results:  Labs reviewed in Epic  Results for orders placed or performed in visit on 03/30/23 (from the past 24 hour(s))   CBC with platelets and differential    Narrative    The following orders were created for panel order CBC with platelets and differential.  Procedure                               Abnormality         Status                     ---------                               -----------         ------                     CBC with platelets and d...[[id number]]                                                   Please view results for these tests on the individual orders.     Results for orders placed or performed in visit on 03/30/23   CBC with platelets and differential     Status: None ()    " Narrative    The following orders were created for panel order CBC with platelets and differential.  Procedure                               Abnormality         Status                     ---------                               -----------         ------                     CBC with platelets and d...[[id number]]                                                   Please view results for these tests on the individual orders.       ASSESSMENT/PLAN:       ICD-10-CM    1. Routine history and physical examination of adult  Z00.00 Comprehensive metabolic panel (BMP + Alb, Alk Phos, ALT, AST, Total. Bili, TP)     Lipid Profile (Chol, Trig, HDL, LDL calc)     TSH with free T4 reflex     Hemoglobin A1c     Hepatitis C Screen Reflex to HCV RNA Quant and Genotype     CBC with platelets and differential     Comprehensive metabolic panel (BMP + Alb, Alk Phos, ALT, AST, Total. Bili, TP)     Lipid Profile (Chol, Trig, HDL, LDL calc)     TSH with free T4 reflex     Hemoglobin A1c     Hepatitis C Screen Reflex to HCV RNA Quant and Genotype     CBC with platelets and differential      2. Hormone replacement therapy (postmenopausal)  Z79.890     managed by [facility]      3. Family history of malignant neoplasm of breast  Z80.3       4. Shortness of breath  R06.02 Adult Cardiology Eval  Referral      5. Family history of ischemic heart disease  Z82.49 Adult Cardiology Eval  Referral      6. Former smoker  Z87.891 Adult Cardiology Eval  Referral     General PFT Lab (Please always keep checked)     Pulmonary Function Test      7. Diverticulosis of large intestine without hemorrhage  K57.30       8. Hemorrhoids, unspecified hemorrhoid type  K64.9       9. History of kidney stones  Z87.442       10. Family history of osteoporosis  Z82.62       11. History of uterine fibroid  Z86.018       12. Multiple pigmented nevi  D22.9     sees derm consultants yearly      13. History of basal cell carcinoma  Z85.828      s/p mohs face      14. Health care maintenance  Z00.00       15. Advanced directives, counseling/discussion  Z71.89       16. Colon cancer screening  Z12.11       17. Need for hepatitis B vaccination  Z23       18. Need for prophylactic vaccination and inoculation against influenza  Z23       19. Need for COVID-19 vaccine  Z23       20. Need for shingles vaccine  Z23       21. Screening, anemia, deficiency, iron  Z13.0 CBC with platelets and differential     CBC with platelets and differential      22. Screening for diabetes mellitus  Z13.1 Comprehensive metabolic panel (BMP + Alb, Alk Phos, ALT, AST, Total. Bili, TP)     Hemoglobin A1c     Comprehensive metabolic panel (BMP + Alb, Alk Phos, ALT, AST, Total. Bili, TP)     Hemoglobin A1c      23. Encounter for lipid screening for cardiovascular disease  Z13.220 Lipid Profile (Chol, Trig, HDL, LDL calc)    Z13.6 Lipid Profile (Chol, Trig, HDL, LDL calc)      24. Screening for thyroid disorder  Z13.29 TSH with free T4 reflex     TSH with free T4 reflex      25. Need for hepatitis C screening test  Z11.59 Hepatitis C Screen Reflex to HCV RNA Quant and Genotype     Hepatitis C Screen Reflex to HCV RNA Quant and Genotype      26. HIV screening declined  Z53.20         Seen for preventive health and additional concerns today   Self breast check regularly   Consider referral to a  to assess personal risk if should have any family hx of breast, ovarian or bowel cancer  Mammogram due to get with women's clinic/ gyn  Pelvic / PAP / HPV reported normal at Gyn in 2022, will get us records    On hormone replacement therapy Vivelle patch and Prometrium managed by women's clinic.  Please discuss family history and shortness of breath with your OB women's health provider.  Recommend mammogram yearly.  Small risk of blood clots stroke heart attack breast and ovarian cancer discussed    Shortness of breath on waking up and start of exercise but not with exercise does not  sound cardiac per se.  Could be related to being a former smoker.  Referred to cardiology given family history of heart disease to assess for the best screening test might be and for holistic preventive eval.  Lung function test also ordered may do if covered by insurance.  Lungs are clear chest x-ray not done.  Currently does not meet criteria for ct lung cancer screening.    Diverticulosis asymptomatic continue with high-fiber diet.  Hemorrhoids asymptomatic.    History of kidney stones in the past unknown kind, decrease oxalate in diet and increase water intake has not had symptoms in over 10 years.    Family history of osteoporosis reported DEXA scan at women's clinic 3 years ago was normal.  Continue with being active.  Recommend calcium 1200 mg total a day and vitamin D 3 2000 units daily.    History of uterine fibroids s/p surgery in the past asymptomatic.    Multiple pigmented nevi and moles history of basal cell cancer removed from face continue follow-up with Derm consultants yearly.    May bring us a copy of your healthcare directives to put in your chart if desired.  Colon cancer screening colonoscopy due 2029 health care maintenance reviewed  Consider working on health care directives  Recommend Flu shot yearly and opted not to get  Recommend Tdap every 10 yrs and up-to-date consider hepatitis B vaccination a series of 3 shots at 0, 2 and 4-month intervals.  Consider COVID booster.  Consider Shingrex. shingles vaccines a series of 2 shots 2 to 6 months apart that can cause couple days of flu like symptoms but is expensive so to check with insurance and get at pharmacy if cheaper there.  Pneumonia vaccine at 65 or earlier any risk factors  If travelling out of the country recommend seeing the travel clinic to update appropriate shots etc  Labs today and will make further recommendations once reviewed.  Opted out of HIV testing.  Low risk and may have been previously tested when having IVF in the  past    Return in 1 year for preventive physical and sooner in an office visit for any new concern    Patient has been advised of split billing requirements and indicates understanding: Yes      COUNSELING:  Reviewed preventive health counseling, as reflected in patient instructions            Regular exercise       Healthy diet/nutrition       Vision screening       Immunizations    Declined: Covid-19, Hepatitis B, Influenza and Zoster due to Concerns about side effects/safety           Alcohol Use       Osteoporosis prevention/bone health       Colorectal Cancer Screening       Consider Hep C screening for all patients one time for ages 18-79 years       HIV screeninx in teen years, 1x in adult years, and at intervals if high risk       (Bhakti)menopause management       Consider lung cancer screening for ages 55-80 years (77 for Medicare) and 20 pack-year smoking history        The ASCVD Risk score (Zoë ELY, et al., 2019) failed to calculate for the following reasons:    Cannot find a previous HDL lab    Cannot find a previous total cholesterol lab       Advance Care Planning    She reports that she has never smoked. She has never used smokeless tobacco.    [place] MD  [facility]

## 2023-03-30 NOTE — RESULT ENCOUNTER NOTE
Mikala Ms. Perla,  Some of your results came back and are within acceptable limits. -Normal red blood cell (hgb) levels, normal white blood cell count and normal platelet levels..  1 portion of the red blood cell called MCV which checks for red cell concentration is a bit on the upper end of normal.  This can be associated with vitamin B12 deficiency at times.  I did not check for vitamin B12 as and as its not part of routine preventive physical but may be something we can consider in the future if mcv remains elevated   if you have any further concerns please do not hesitate to contact us by message, phone or making an appointment.  Have a good day   Dr Wood CARDENAS

## 2023-03-30 NOTE — PATIENT INSTRUCTIONS
Seen for preventive health and additional concerns today   Self breast check regularly   Consider referral to a  to assess personal risk if should have any family hx of breast, ovarian or bowel cancer  Mammogram due to get with [facility]/ gyn  Pelvic / PAP / HPV reported normal at Gyn in 2022, will get us records    On hormone replacement therapy Vivelle patch and Prometrium managed by women's clinic.  Please discuss family history and shortness of breath with your OB women's health provider.  Recommend mammogram yearly.  Small risk of blood clots stroke heart attack breast and ovarian cancer discussed    Shortness of breath on waking up and start of exercise but not with exercise does not sound cardiac per se.  Could be related to being a former smoker.  Referred to cardiology given family history of heart disease to assess for the best screening test might be and for holistic preventive eval.  Lung function test also ordered may do if covered by insurance.  Lungs are clear chest x-ray not done.  Currently does not meet criteria for ct lung cancer screening.    Diverticulosis asymptomatic continue with high-fiber diet.  Hemorrhoids asymptomatic.    History of kidney stones in the past unknown kind, decrease oxalate in diet and increase water intake has not had symptoms in over 10 years.    Family history of osteoporosis reported DEXA scan at women's clinic 3 years ago was normal.  Continue with being active.  Recommend calcium 1200 mg total a day and vitamin D 3 2000 units daily.    History of uterine fibroids s/p surgery in the past asymptomatic.    Multiple pigmented nevi and moles history of basal cell cancer removed from face continue follow-up with Derm consultants yearly.    May bring us a copy of your healthcare directives to put in your chart if desired.  Colon cancer screening colonoscopy due 2029 health care maintenance reviewed  Consider working on health care directives  Recommend Flu shot  yearly and opted not to get  Recommend Tdap every 10 yrs and up-to-date consider hepatitis B vaccination a series of 3 shots at 0, 2 and 4-month intervals.  Consider COVID booster.  Consider Shingrex. shingles vaccines a series of 2 shots 2 to 6 months apart that can cause couple days of flu like symptoms but is expensive so to check with insurance and get at pharmacy if cheaper there.  Pneumonia vaccine at 65 or earlier any risk factors  If travelling out of the country recommend seeing the travel clinic to update appropriate shots etc  Labs today and will make further recommendations once reviewed.  Opted out of HIV testing.  Low risk and may have been previously tested when having IVF in the past    Return in 1 year for preventive physical and sooner in an office visit for any new concern      Preventive Health Recommendations  Female Ages 50 - 64    Yearly exam: See your health care provider every year in order to  Review health changes.   Discuss preventive care.    Review your medicines if your doctor has prescribed any.    Get a Pap test every three years (unless you have an abnormal result and your provider advises testing more often).  If you get Pap tests with HPV test, you only need to test every 5 years, unless you have an abnormal result.   You do not need a Pap test if your uterus was removed (hysterectomy) and you have not had cancer.  You should be tested each year for STDs (sexually transmitted diseases) if you're at risk.   Have a mammogram every 1 to 2 years.  Have a colonoscopy at age 50, or have a yearly FIT test (stool test). These exams screen for colon cancer.    Have a cholesterol test every 5 years, or more often if advised.  Have a diabetes test (fasting glucose) every three years. If you are at risk for diabetes, you should have this test more often.   If you are at risk for osteoporosis (brittle bone disease), think about having a bone density scan (DEXA).    Shots: Get a flu shot each  year. Get a tetanus shot every 10 years.    Nutrition:   Eat at least 5 servings of fruits and vegetables each day.  Eat whole-grain bread, whole-wheat pasta and brown rice instead of white grains and rice.  Get adequate Calcium and Vitamin D.     Lifestyle  Exercise at least 150 minutes a week (30 minutes a day, 5 days a week). This will help you control your weight and prevent disease.  Limit alcohol to one drink per day.  No smoking.   Wear sunscreen to prevent skin cancer.   See your dentist every six months for an exam and cleaning.  See your eye doctor every 1 to 2 years.

## 2023-03-31 NOTE — RESULT ENCOUNTER NOTE
Mikala Ms. Perla,  Your results came back showing  -LDL(bad) cholesterol level is elevated which can increase your heart disease risk.  A diet high in fat and simple carbohydrates, genetics and being overweight can contribute to this. ADVISE: exercising 150 minutes of aerobic exercise per week (30 minutes for 5 days per week or 50 minutes for 3 days per week are options) and eating a low saturated fat/low carbohydrate diet are helpful to improve this. In 12 months, you should recheck your fasting cholesterol panel.  The 10-year ASCVD risk score (Zoë ELY, et al., 2019) is: 1.1%    Values used to calculate the score:      Age: 57 years      Sex: Female      Is Non- : No      Diabetic: No      Tobacco smoker: No      Systolic Blood Pressure: 102 mmHg      Is BP treated: No      HDL Cholesterol: 92 mg/dL      Total Cholesterol: 241 mg/dL  Overall cardiovascular risk estimated to be low. Will see what ct calcium score shows  -Liver and gallbladder tests are normal (ALT,AST, Alk phos, bilirubin), kidney function is normal (Cr, GFR), sodium is normal, potassium is normal, calcium is normal, glucose is normal.  -TSH (thyroid stimulating hormone) level is normal which indicates normal thyroid function.  -Hepatitis C antibody screen test shows no signs of a previous hepatitis C infection.  . If you have any further concerns please do not hesitate to contact us by message, phone or making an appointment.  Have a good day   Dr Wood CARDENAS

## 2023-04-23 ENCOUNTER — HEALTH MAINTENANCE LETTER (OUTPATIENT)
Age: 58
End: 2023-04-23

## 2023-05-08 ENCOUNTER — OFFICE VISIT (OUTPATIENT)
Dept: CARDIOLOGY | Facility: CLINIC | Age: 58
End: 2023-05-08
Attending: FAMILY MEDICINE
Payer: COMMERCIAL

## 2023-05-08 VITALS
DIASTOLIC BLOOD PRESSURE: 62 MMHG | HEART RATE: 61 BPM | OXYGEN SATURATION: 98 % | BODY MASS INDEX: 23.83 KG/M2 | HEIGHT: 61 IN | SYSTOLIC BLOOD PRESSURE: 104 MMHG | WEIGHT: 126.2 LBS | RESPIRATION RATE: 16 BRPM

## 2023-05-08 DIAGNOSIS — Z82.49 FAMILY HISTORY OF ISCHEMIC HEART DISEASE: ICD-10-CM

## 2023-05-08 DIAGNOSIS — R06.02 SHORTNESS OF BREATH: Primary | ICD-10-CM

## 2023-05-08 DIAGNOSIS — E78.00 PURE HYPERCHOLESTEROLEMIA: ICD-10-CM

## 2023-05-08 DIAGNOSIS — Z87.891 FORMER SMOKER: ICD-10-CM

## 2023-05-08 LAB — APO A-I SERPL-MCNC: 74 MG/DL

## 2023-05-08 PROCEDURE — 99204 OFFICE O/P NEW MOD 45 MIN: CPT | Performed by: INTERNAL MEDICINE

## 2023-05-08 PROCEDURE — 36415 COLL VENOUS BLD VENIPUNCTURE: CPT | Performed by: INTERNAL MEDICINE

## 2023-05-08 PROCEDURE — 83695 ASSAY OF LIPOPROTEIN(A): CPT | Performed by: INTERNAL MEDICINE

## 2023-05-08 NOTE — PROGRESS NOTES
[facility] Heart Care Office Consult     Assessment:   (R06.02) Shortness of breath  (primary encounter diagnosis)  Comment: Could represent anginal equivalent, given this we will arrange for stress echo.  If significantly abnormal pursue invasive angiography, if mildly abnormal consider coronary CTA.    (E78.00) Pure hypercholesterolemia  Comment: Under good diet with total cholesterol 241 and LDL of 136.  No signs of heterozygous hypercholesterolemia as there were no xanthomas.  Her cardiovascular risk is 1.1% 10-year but 30 somewhat percent lifetime and given this would recommend statin therapy.  Would consider moderate dose atorvastatin at 40 mg or rosuvastatin at 20 mg.  We will check LP(a).    (Z82.49) Family history of ischemic heart disease  Comment: As above, will treat lipids.  We will check LP(a).  We will get stress echo.    (Z87.191) Former smoker  Comment: So noted and adjust risk as above.     Plan:   1.  Check LP(a) and address if abnormal.  2.  Check stress echo and address if significantly abnormal with angiography or mildly abnormal with CTA.  3.  Follow-up as needed.  4.  If heart pounding persists consider event monitor.    History of Present Illness:   Thank you for asking the [facility] Heart Care team to see Nhi Perla a 57 year old  female  in consultation  to evaluate shortness of breath.   Patient is noted over the last 6 months she has had some increased shortness of breath and activity.  She states this is specifically when she is on the treadmill working out.  She has also noted during stressful situations her heart is racing or beating heavily but there is no angina, effort related angina, syncope, dizziness or peripheral edema.    Past Medical History:     Past Medical History:   Diagnosis Date     Pure hypercholesterolemia 08/09/2013     Dysfunctional uterine bleeding 03/30/2023     Encounter for artificial insemination     with own partner     History of in vitro  "fertilization     x 1     History of kidney stones      Past history is negative for cancer, tuberculosis, diabetes mellitus, myocardial infarction,  rheumatic fever, hypertension, cerebrovascular accident, chronic kidney disease, peptic ulcer disease, chronic obstructive pulmonary disease, or thyroid disorder.    Past Surgical History:     Past Surgical History:   Procedure Laterality Date     AS KNEE SCOPE,MED/LAT MENISCUS REPAIR Right 2008    meniscus     ROTATOR CUFF REPAIR RT/LT Right 2013     UTERINE FIBROID SURGERY       Family History:   Family history positive coronary artery bypass grafting in her father at the age of 58.    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 Her smoking use included cigarettes, less than a pack a day for 15 years. She started smoking about 39 years ago quitting 23 years ago. She has a 4.00 pack-year smoking history. She has never used smokeless tobacco. She reports current alcohol use. She reports that she does not use drugs. The primary care physician is Little Muir    Meds:   Scheduled Meds:  Current Outpatient Medications   Medication Sig Dispense Refill     estradiol (VIVELLE-DOT) 0.0375 MG/24HR BIW patch APPLY 1 PATCH TRANSDERMALLY 2 TIMES WEEKLY       Multiple Vitamins-Minerals (MULTIVITAMIN ADULTS PO)        progesterone (PROMETRIUM) 100 MG capsule Take 100 mg by mouth At Bedtime         PRN Meds:.    Allergies:   Pcn [penicillin g]    Objective:      Physical Exam  57.2 kg (126 lb 3.2 oz)  1.549 m (5' 1\")  Body mass index is 23.85 kg/m .  /62 (BP Location: Right arm, Patient Position: Sitting, Cuff Size: Adult Regular)   Pulse 61   Resp 16   Ht 1.549 m (5' 1\")   Wt 57.2 kg (126 lb 3.2 oz)   SpO2 98%   BMI 23.85 kg/m      General Appearance:   Alert, cooperative and in no acute distress.   HEENT:  No scleral icterus; the mucous membranes were pink and " "moist.   Neck: JVP normal. No thyromegaly. No HJR   Chest: The spine was straight. The chest was symmetric.   Lungs:   Respirations unlabored; the lungs are clear to auscultation.   Cardiovascular:   S1 and S2 without murmur, clicks or rubs. Brachial, radial, carotid and posterior tibial pulses are intact and symetrical.  No carotid bruits noted   Abdomen:  No organomegaly, masses, bruits, or tenderness. Bowels sounds are present   Extremities: No cyanosis, clubbing, or edema.   Skin: No xanthelasma.   Neurologic: Mood and affect are appropriate.         Lab Reviewed Personally by myself  Lab Results   Component Value Date     03/30/2023    CO2 24 03/30/2023    BUN 15.8 03/30/2023     Lab Results   Component Value Date    WBC 4.3 03/30/2023    HGB 12.8 03/30/2023    HGB 13.0 08/15/2013    HCT 38.6 03/30/2023     03/30/2023     03/30/2023     Lab Results   Component Value Date    CHOL 241 03/30/2023    CHOL 232 10/10/2017    TRIG 65 03/30/2023    TRIG 121 10/10/2017    HDL 92 03/30/2023     10/10/2017     No results found for: BNP    ECG personally reviewed by myself shows not available, to be obtained during stress echo         Review of Systems:     Review of Systems:   Enc Vitals  BP: 104/62  Pulse: 61  Resp: 16  SpO2: 98 %  Weight: 57.2 kg (126 lb 3.2 oz)  Height: 154.9 cm (5' 1\")                                          "

## 2023-05-08 NOTE — LETTER
5/8/2023    Little Muir MD  [address]  [place] [address]    RE: Nhi Perla       Dear Colleague,     I had the pleasure of seeing Nhi Perla in the [facility].    [facility] Heart Care Office Consult     Assessment:   (R06.02) Shortness of breath  (primary encounter diagnosis)  Comment: Could represent anginal equivalent, given this we will arrange for stress echo.  If significantly abnormal pursue invasive angiography, if mildly abnormal consider coronary CTA.    (E78.00) Pure hypercholesterolemia  Comment: Under good diet with total cholesterol 241 and LDL of 136.  No signs of heterozygous hypercholesterolemia as there were no xanthomas.  Her cardiovascular risk is 1.1% 10-year but 30 somewhat percent lifetime and given this would recommend statin therapy.  Would consider moderate dose atorvastatin at 40 mg or rosuvastatin at 20 mg.  We will check LP(a).    (Z82.49) Family history of ischemic heart disease  Comment: As above, will treat lipids.  We will check LP(a).  We will get stress echo.    (Z87.891) Former smoker  Comment: So noted and adjust risk as above.     Plan:   1.  Check LP(a) and address if abnormal.  2.  Check stress echo and address if significantly abnormal with angiography or mildly abnormal with CTA.  3.  Follow-up as needed.  4.  If heart pounding persists consider event monitor.    History of Present Illness:   Thank you for asking the [facility] Heart Care team to see Nhi Perla a 57 year old  female  in consultation  to evaluate shortness of breath.   Patient is noted over the last 6 months she has had some increased shortness of breath and activity.  She states this is specifically when she is on the treadmill working out.  She has also noted during stressful situations her heart is racing or beating heavily but there is no angina, effort related angina, syncope, dizziness or peripheral edema.    Past Medical History:     Past  "Medical History:   Diagnosis Date    Pure hypercholesterolemia 08/09/2013    Dysfunctional uterine bleeding 03/30/2023    Encounter for artificial insemination     with own partner    History of in vitro fertilization     x 1    History of kidney stones      Past history is negative for cancer, tuberculosis, diabetes mellitus, myocardial infarction,  rheumatic fever, hypertension, cerebrovascular accident, chronic kidney disease, peptic ulcer disease, chronic obstructive pulmonary disease, or thyroid disorder.    Past Surgical History:     Past Surgical History:   Procedure Laterality Date    AS KNEE SCOPE,MED/LAT MENISCUS REPAIR Right 2008    meniscus    ROTATOR CUFF REPAIR RT/LT Right 2013    UTERINE FIBROID SURGERY       Family History:   Family history positive coronary artery bypass grafting in her father at the age of 58.    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 Her smoking use included cigarettes, less than a pack a day for 15 years. She started smoking about 39 years ago quitting 23 years ago. She has a 4.00 pack-year smoking history. She has never used smokeless tobacco. She reports current alcohol use. She reports that she does not use drugs. The primary care physician is [facility]    Meds:   Scheduled Meds:  Current Outpatient Medications   Medication Sig Dispense Refill    estradiol (VIVELLE-DOT) 0.0375 MG/24HR BIW patch APPLY 1 PATCH TRANSDERMALLY 2 TIMES WEEKLY      Multiple Vitamins-Minerals (MULTIVITAMIN ADULTS PO)       progesterone (PROMETRIUM) 100 MG capsule Take 100 mg by mouth At Bedtime         PRN Meds:.    Allergies:   Pcn [penicillin g]    Objective:      Physical Exam  57.2 kg (126 lb 3.2 oz)  1.549 m (5' 1\")  Body mass index is 23.85 kg/m .  /62 (BP Location: Right arm, Patient Position: Sitting, Cuff Size: Adult Regular)   Pulse 61   Resp 16   Ht 1.549 m (5' 1\")   " "Wt 57.2 kg (126 lb 3.2 oz)   SpO2 98%   BMI 23.85 kg/m      General Appearance:   Alert, cooperative and in no acute distress.   HEENT:  No scleral icterus; the mucous membranes were pink and moist.   Neck: JVP normal. No thyromegaly. No HJR   Chest: The spine was straight. The chest was symmetric.   Lungs:   Respirations unlabored; the lungs are clear to auscultation.   Cardiovascular:   S1 and S2 without murmur, clicks or rubs. Brachial, radial, carotid and posterior tibial pulses are intact and symetrical.  No carotid bruits noted   Abdomen:  No organomegaly, masses, bruits, or tenderness. Bowels sounds are present   Extremities: No cyanosis, clubbing, or edema.   Skin: No xanthelasma.   Neurologic: Mood and affect are appropriate.         Lab Reviewed Personally by myself  Lab Results   Component Value Date     03/30/2023    CO2 24 03/30/2023    BUN 15.8 03/30/2023     Lab Results   Component Value Date    WBC 4.3 03/30/2023    HGB 12.8 03/30/2023    HGB 13.0 08/15/2013    HCT 38.6 03/30/2023     03/30/2023     03/30/2023     Lab Results   Component Value Date    CHOL 241 03/30/2023    CHOL 232 10/10/2017    TRIG 65 03/30/2023    TRIG 121 10/10/2017    HDL 92 03/30/2023     10/10/2017     No results found for: BNP    ECG personally reviewed by myself shows not available, to be obtained during stress echo         Review of Systems:     Review of Systems:   Enc Vitals  BP: 104/62  Pulse: 61  Resp: 16  SpO2: 98 %  Weight: 57.2 kg (126 lb 3.2 oz)  Height: 154.9 cm (5' 1\")                                              Thank you for allowing me to participate in the care of your patient.      Sincerely,     HARMAN HARVEY MD     [facility]  cc:   Little Muir MD  [address] [place] [address]        "

## 2023-05-08 NOTE — PATIENT INSTRUCTIONS
Ms. Nhi Perla,  It certainly was nice to meet you today.  Per our conversation you're having some shortness of breath.  This could represent heart blockages, the reason I am checking the ultrasound stress test of the heart.  I am also going to check the hereditary type of cholesterol called lipoprotein a but assume this will be normal.  We will call you the results of these tests.  In the interim continue the diet you are on but I would recommend starting a cholesterol pill low-dose.  Unless the tests are significantly abnormal I would not necessarily plan on seeing you again.  Good luck.  Carlton Hsieh

## 2023-05-30 ENCOUNTER — LAB REQUISITION (OUTPATIENT)
Dept: LAB | Facility: CLINIC | Age: 58
End: 2023-05-30

## 2023-05-30 DIAGNOSIS — Z12.4 ENCOUNTER FOR SCREENING FOR MALIGNANT NEOPLASM OF CERVIX: ICD-10-CM

## 2023-05-30 PROCEDURE — G0145 SCR C/V CYTO,THINLAYER,RESCR: HCPCS | Performed by: OBSTETRICS & GYNECOLOGY

## 2023-06-02 LAB
BKR LAB AP GYN ADEQUACY: NORMAL
BKR LAB AP GYN INTERPRETATION: NORMAL
BKR LAB AP HPV REFLEX: NORMAL
BKR LAB AP LMP: NORMAL
BKR LAB AP PREVIOUS ABNL DX: NORMAL
BKR LAB AP PREVIOUS ABNORMAL: NORMAL
PATH REPORT.COMMENTS IMP SPEC: NORMAL
PATH REPORT.COMMENTS IMP SPEC: NORMAL
PATH REPORT.RELEVANT HX SPEC: NORMAL

## 2023-06-21 ENCOUNTER — ANCILLARY PROCEDURE (OUTPATIENT)
Dept: MAMMOGRAPHY | Facility: HOSPITAL | Age: 58
End: 2023-06-21
Attending: FAMILY MEDICINE
Payer: COMMERCIAL

## 2023-06-21 DIAGNOSIS — Z12.31 VISIT FOR SCREENING MAMMOGRAM: ICD-10-CM

## 2023-06-21 PROCEDURE — 77067 SCR MAMMO BI INCL CAD: CPT

## 2023-06-30 ENCOUNTER — HOSPITAL ENCOUNTER (OUTPATIENT)
Dept: CARDIOLOGY | Facility: CLINIC | Age: 58
Discharge: HOME | End: 2023-06-30
Attending: INTERNAL MEDICINE | Admitting: INTERNAL MEDICINE
Payer: COMMERCIAL

## 2023-06-30 DIAGNOSIS — Z82.49 FAMILY HISTORY OF ISCHEMIC HEART DISEASE: ICD-10-CM

## 2023-06-30 DIAGNOSIS — R06.02 SHORTNESS OF BREATH: ICD-10-CM

## 2023-06-30 DIAGNOSIS — Z87.891 FORMER SMOKER: ICD-10-CM

## 2023-06-30 PROCEDURE — 93321 DOPPLER ECHO F-UP/LMTD STD: CPT | Mod: 26 | Performed by: INTERNAL MEDICINE

## 2023-06-30 PROCEDURE — 93018 CV STRESS TEST I&R ONLY: CPT | Performed by: INTERNAL MEDICINE

## 2023-06-30 PROCEDURE — 93350 STRESS TTE ONLY: CPT | Mod: 26 | Performed by: INTERNAL MEDICINE

## 2023-06-30 PROCEDURE — 93321 DOPPLER ECHO F-UP/LMTD STD: CPT | Mod: TC

## 2023-06-30 PROCEDURE — 93016 CV STRESS TEST SUPVJ ONLY: CPT | Performed by: INTERNAL MEDICINE

## 2023-06-30 PROCEDURE — 93325 DOPPLER ECHO COLOR FLOW MAPG: CPT | Mod: TC

## 2023-06-30 PROCEDURE — 93325 DOPPLER ECHO COLOR FLOW MAPG: CPT | Mod: 26 | Performed by: INTERNAL MEDICINE

## 2024-07-02 ENCOUNTER — ANCILLARY PROCEDURE (OUTPATIENT)
Dept: MAMMOGRAPHY | Facility: HOSPITAL | Age: 59
End: 2024-07-02
Attending: FAMILY MEDICINE
Payer: COMMERCIAL

## 2024-07-02 DIAGNOSIS — Z12.31 VISIT FOR SCREENING MAMMOGRAM: ICD-10-CM

## 2024-07-02 PROCEDURE — 77063 BREAST TOMOSYNTHESIS BI: CPT

## 2024-07-06 ENCOUNTER — HEALTH MAINTENANCE LETTER (OUTPATIENT)
Age: 59
End: 2024-07-06

## 2024-08-19 SDOH — HEALTH STABILITY: PHYSICAL HEALTH: ON AVERAGE, HOW MANY MINUTES DO YOU ENGAGE IN EXERCISE AT THIS LEVEL?: 50 MIN

## 2024-08-19 SDOH — HEALTH STABILITY: PHYSICAL HEALTH: ON AVERAGE, HOW MANY DAYS PER WEEK DO YOU ENGAGE IN MODERATE TO STRENUOUS EXERCISE (LIKE A BRISK WALK)?: 5 DAYS

## 2024-08-19 ASSESSMENT — SOCIAL DETERMINANTS OF HEALTH (SDOH): HOW OFTEN DO YOU GET TOGETHER WITH FRIENDS OR RELATIVES?: THREE TIMES A WEEK

## 2024-08-20 ENCOUNTER — OFFICE VISIT (OUTPATIENT)
Dept: FAMILY MEDICINE | Facility: CLINIC | Age: 59
End: 2024-08-20
Payer: COMMERCIAL

## 2024-08-20 VITALS
RESPIRATION RATE: 16 BRPM | HEIGHT: 62 IN | HEART RATE: 74 BPM | OXYGEN SATURATION: 99 % | BODY MASS INDEX: 22.95 KG/M2 | TEMPERATURE: 98 F | SYSTOLIC BLOOD PRESSURE: 108 MMHG | WEIGHT: 124.7 LBS | DIASTOLIC BLOOD PRESSURE: 72 MMHG

## 2024-08-20 DIAGNOSIS — Z23 NEED FOR COVID-19 VACCINE: ICD-10-CM

## 2024-08-20 DIAGNOSIS — K64.9 HEMORRHOIDS, UNSPECIFIED HEMORRHOID TYPE: ICD-10-CM

## 2024-08-20 DIAGNOSIS — E78.00 PURE HYPERCHOLESTEROLEMIA: ICD-10-CM

## 2024-08-20 DIAGNOSIS — Z71.89 ADVANCED DIRECTIVES, COUNSELING/DISCUSSION: ICD-10-CM

## 2024-08-20 DIAGNOSIS — Z80.3 FAMILY HISTORY OF MALIGNANT NEOPLASM OF BREAST: ICD-10-CM

## 2024-08-20 DIAGNOSIS — Z82.49 FAMILY HISTORY OF ISCHEMIC HEART DISEASE: ICD-10-CM

## 2024-08-20 DIAGNOSIS — Z00.00 ROUTINE HISTORY AND PHYSICAL EXAMINATION OF ADULT: Primary | ICD-10-CM

## 2024-08-20 DIAGNOSIS — Z23 NEED FOR SHINGLES VACCINE: ICD-10-CM

## 2024-08-20 DIAGNOSIS — Z86.018 HISTORY OF UTERINE FIBROID: ICD-10-CM

## 2024-08-20 DIAGNOSIS — Z23 NEED FOR HEPATITIS A AND B VACCINATION: ICD-10-CM

## 2024-08-20 DIAGNOSIS — Z82.62 FAMILY HISTORY OF OSTEOPOROSIS: ICD-10-CM

## 2024-08-20 DIAGNOSIS — K57.30 DIVERTICULOSIS OF LARGE INTESTINE WITHOUT HEMORRHAGE: ICD-10-CM

## 2024-08-20 DIAGNOSIS — D22.9 MULTIPLE PIGMENTED NEVI: ICD-10-CM

## 2024-08-20 DIAGNOSIS — Z87.442 HISTORY OF KIDNEY STONES: ICD-10-CM

## 2024-08-20 DIAGNOSIS — Z13.1 SCREENING FOR DIABETES MELLITUS: ICD-10-CM

## 2024-08-20 DIAGNOSIS — Z12.11 COLON CANCER SCREENING: ICD-10-CM

## 2024-08-20 DIAGNOSIS — Z79.890 HORMONE REPLACEMENT THERAPY (POSTMENOPAUSAL): ICD-10-CM

## 2024-08-20 DIAGNOSIS — R71.8 ELEVATED MCV: ICD-10-CM

## 2024-08-20 DIAGNOSIS — Z85.828 HISTORY OF BASAL CELL CARCINOMA: ICD-10-CM

## 2024-08-20 DIAGNOSIS — Z13.0 SCREENING, ANEMIA, DEFICIENCY, IRON: ICD-10-CM

## 2024-08-20 DIAGNOSIS — Z87.891 FORMER SMOKER: ICD-10-CM

## 2024-08-20 DIAGNOSIS — Z00.00 HEALTH CARE MAINTENANCE: ICD-10-CM

## 2024-08-20 LAB
ALBUMIN SERPL BCG-MCNC: 4.3 G/DL (ref 3.5–5.2)
ALP SERPL-CCNC: 47 U/L (ref 40–150)
ALT SERPL W P-5'-P-CCNC: 21 U/L (ref 0–50)
ANION GAP SERPL CALCULATED.3IONS-SCNC: 10 MMOL/L (ref 7–15)
AST SERPL W P-5'-P-CCNC: 23 U/L (ref 0–45)
BASOPHILS # BLD AUTO: 0.1 10E3/UL (ref 0–0.2)
BASOPHILS NFR BLD AUTO: 1 %
BILIRUB SERPL-MCNC: 0.6 MG/DL
BUN SERPL-MCNC: 13 MG/DL (ref 6–20)
CALCIUM SERPL-MCNC: 9.2 MG/DL (ref 8.8–10.4)
CHLORIDE SERPL-SCNC: 103 MMOL/L (ref 98–107)
CHOLEST SERPL-MCNC: 252 MG/DL
CREAT SERPL-MCNC: 0.75 MG/DL (ref 0.51–0.95)
EGFRCR SERPLBLD CKD-EPI 2021: >90 ML/MIN/1.73M2
EOSINOPHIL # BLD AUTO: 0.2 10E3/UL (ref 0–0.7)
EOSINOPHIL NFR BLD AUTO: 3 %
ERYTHROCYTE [DISTWIDTH] IN BLOOD BY AUTOMATED COUNT: 11.8 % (ref 10–15)
FASTING STATUS PATIENT QL REPORTED: YES
FASTING STATUS PATIENT QL REPORTED: YES
GLUCOSE SERPL-MCNC: 98 MG/DL (ref 70–99)
HBA1C MFR BLD: 5.6 % (ref 0–5.6)
HCO3 SERPL-SCNC: 26 MMOL/L (ref 22–29)
HCT VFR BLD AUTO: 40.6 % (ref 35–47)
HDLC SERPL-MCNC: 87 MG/DL
HGB BLD-MCNC: 13.2 G/DL (ref 11.7–15.7)
IMM GRANULOCYTES # BLD: 0 10E3/UL
IMM GRANULOCYTES NFR BLD: 0 %
LDLC SERPL CALC-MCNC: 148 MG/DL
LYMPHOCYTES # BLD AUTO: 1.5 10E3/UL (ref 0.8–5.3)
LYMPHOCYTES NFR BLD AUTO: 29 %
MCH RBC QN AUTO: 33.3 PG (ref 26.5–33)
MCHC RBC AUTO-ENTMCNC: 32.5 G/DL (ref 31.5–36.5)
MCV RBC AUTO: 103 FL (ref 78–100)
MONOCYTES # BLD AUTO: 0.6 10E3/UL (ref 0–1.3)
MONOCYTES NFR BLD AUTO: 11 %
NEUTROPHILS # BLD AUTO: 3 10E3/UL (ref 1.6–8.3)
NEUTROPHILS NFR BLD AUTO: 56 %
NONHDLC SERPL-MCNC: 165 MG/DL
PLATELET # BLD AUTO: 411 10E3/UL (ref 150–450)
POTASSIUM SERPL-SCNC: 4.3 MMOL/L (ref 3.4–5.3)
PROT SERPL-MCNC: 7 G/DL (ref 6.4–8.3)
RBC # BLD AUTO: 3.96 10E6/UL (ref 3.8–5.2)
SODIUM SERPL-SCNC: 139 MMOL/L (ref 135–145)
TRIGL SERPL-MCNC: 85 MG/DL
TSH SERPL DL<=0.005 MIU/L-ACNC: 2.5 UIU/ML (ref 0.3–4.2)
VIT B12 SERPL-MCNC: 822 PG/ML (ref 232–1245)
WBC # BLD AUTO: 5.3 10E3/UL (ref 4–11)

## 2024-08-20 PROCEDURE — 80053 COMPREHEN METABOLIC PANEL: CPT | Performed by: FAMILY MEDICINE

## 2024-08-20 PROCEDURE — 80061 LIPID PANEL: CPT | Performed by: FAMILY MEDICINE

## 2024-08-20 PROCEDURE — 99213 OFFICE O/P EST LOW 20 MIN: CPT | Mod: 25 | Performed by: FAMILY MEDICINE

## 2024-08-20 PROCEDURE — 83036 HEMOGLOBIN GLYCOSYLATED A1C: CPT | Performed by: FAMILY MEDICINE

## 2024-08-20 PROCEDURE — 36415 COLL VENOUS BLD VENIPUNCTURE: CPT | Performed by: FAMILY MEDICINE

## 2024-08-20 PROCEDURE — 84443 ASSAY THYROID STIM HORMONE: CPT | Performed by: FAMILY MEDICINE

## 2024-08-20 PROCEDURE — 85025 COMPLETE CBC W/AUTO DIFF WBC: CPT | Performed by: FAMILY MEDICINE

## 2024-08-20 PROCEDURE — 99396 PREV VISIT EST AGE 40-64: CPT | Mod: 25 | Performed by: FAMILY MEDICINE

## 2024-08-20 PROCEDURE — 90471 IMMUNIZATION ADMIN: CPT | Performed by: FAMILY MEDICINE

## 2024-08-20 PROCEDURE — 90636 HEP A/HEP B VACC ADULT IM: CPT | Performed by: FAMILY MEDICINE

## 2024-08-20 PROCEDURE — 82607 VITAMIN B-12: CPT | Performed by: FAMILY MEDICINE

## 2024-08-20 ASSESSMENT — PAIN SCALES - GENERAL: PAINLEVEL: NO PAIN (0)

## 2024-08-20 NOTE — NURSING NOTE
Prior to immunization administration, verified patients identity using patient s name and date of birth. Please see Immunization Activity for additional information.     Screening Questionnaire for Adult Immunization    Are you sick today?   No   Do you have allergies to medications, food, a vaccine component or latex?   No   Have you ever had a serious reaction after receiving a vaccination?   No   Do you have a long-term health problem with heart, lung, kidney, or metabolic disease (e.g., diabetes), asthma, a blood disorder, no spleen, complement component deficiency, a cochlear implant, or a spinal fluid leak?  Are you on long-term aspirin therapy?   No   Do you have cancer, leukemia, HIV/AIDS, or any other immune system problem?   No   Do you have a parent, brother, or sister with an immune system problem?   No   In the past 3 months, have you taken medications that affect  your immune system, such as prednisone, other steroids, or anticancer drugs; drugs for the treatment of rheumatoid arthritis, Crohn s disease, or psoriasis; or have you had radiation treatments?   No   Have you had a seizure, or a brain or other nervous system problem?   No   During the past year, have you received a transfusion of blood or blood    products, or been given immune (gamma) globulin or antiviral drug?   No   For women: Are you pregnant or is there a chance you could become       pregnant during the next month?   No   Have you received any vaccinations in the past 4 weeks?   No     Immunization questionnaire answers were all negative.      Patient instructed to remain in clinic for 15 minutes afterwards, and to report any adverse reactions.     Screening performed by Aura Givens MA on 8/20/2024 at 8:29 AM.

## 2024-08-20 NOTE — RESULT ENCOUNTER NOTE
Hello -    Here are my comments about your recent results:  Vitamin B12 level is normal.  Unclear reason why the MCV portion of the Red cell is elevated we will continue to monitor.  Consider doing a CT calcium score to get objective risk if would benefit from a statin medication are not to lower heart attack and stroke risk.  Let me know if you would like me to put this order in  -Liver and gallbladder tests are normal (ALT,AST, Alk phos, bilirubin), kidney function is normal (Cr, GFR), sodium is normal, potassium is normal, calcium is normal, glucose is normal.  -TSH (thyroid stimulating hormone) level is normal which indicates normal thyroid function.  For additional lab test information, labtestsonline.org is an excellent reference..    Please let us know if you have any questions or concerns.     Regards,  Little Muir MD

## 2024-08-20 NOTE — PATIENT INSTRUCTIONS
Seen today for preventive physical and follow-up.  Healthcare maintenance reviewed.  Will bring us a copy of healthcare directives to put in your chart if desired.  Breast exam not done recent mammogram in July normal opted no exam, continue self check regularly and annual screening mammogram yearly given on hormones, and family history of breast cancer.  Pap HPV due 2026 with your gynecologist.  Consider referral to  assess personal risk given family history of breast cancer.  Colon cancer screening due 2029  Currently does not meet criteria for CT lung cancer screening.  Vaccines reviewed.  Twinrix No. 1 for hep A and B given today get the second 1 in 2 months with the nurse and the third 1 4 months after the second.  Consider shingles vaccine a series of 2 shots at 0 and 2-month intervals may cause flulike symptoms for 3 to 4 days after each shot.  Recommend the new COVID-vaccine with the flu shot in the fall.  Fasting lab work today and will make further recommendations once reviewed.    Familial hyperlipidemia overall cardiovascular risk is low does have family history of ischemic heart disease former smoker stress echo done last year for shortness of breath with cardiology was unremarkable.  Will check fasting lipids today if LDL is elevated could consider CT calcium score to get more objective risk of coronary artery disease.  Continue with eating healthy and staying active.  Prior shortness of breath resolved    MCV mildly elevated will check B12 level today along with blood work.    Hormone replacement therapy and Vivelle patch and Prometrium managed by women's clinic understands small risk of blood clots stroke heart attack breast ovarian cancer on this medication and that while it is safe in the first 10 years of use in menopause risk increases after that especially after 60.  DEXA scan done 4 years ago at women[facility] clinic noted normal recheck age 65 or earlier if insurance will cover given family  history of osteoporosis.  Asymptomatic history of uterine fibroids prior surgery for this    Asymptomatic diverticulosis and hemorrhoids noted on last colonoscopy continue with a high-fiber diet.    History of kidney stones in the past unknown kind decrease oxalate in diet continue with staying well-hydrated has not had symptoms in over 11 years.    Family history of osteoporosis continue with calcium 1200 mg total a day vitamin D at least 2000 units daily and staying active check DEXA scan age 60-65 depending on insurance coverage.    Multiple pigmented nevi and moles history of prior basal cell cancer removed from face continue with yearly skin checks with dermatology consultants is going to try and see them this year.    Return in 1 year for preventive physical and sooner in an office visit for any new concerns    The above note was dictated using voice recognition. Although reviewed after completion, some word and grammatical error may remain .               Patient Education   Preventive Care Advice   This is general advice given by our system to help you stay healthy. However, your care team may have specific advice just for you. Please talk to your care team about your preventive care needs.  Nutrition  Eat 5 or more servings of fruits and vegetables each day.  Try wheat bread, brown rice and whole grain pasta (instead of white bread, rice, and pasta).  Get enough calcium and vitamin D. Check the label on foods and aim for 100% of the RDA (recommended daily allowance).  Lifestyle  Exercise at least 150 minutes each week  (30 minutes a day, 5 days a week).  Do muscle strengthening activities 2 days a week. These help control your weight and prevent disease.  No smoking.  Wear sunscreen to prevent skin cancer.  Have a dental exam and cleaning every 6 months.  Yearly exams  See your health care team every year to talk about:  Any changes in your health.  Any medicines your care team has prescribed.  Preventive  care, family planning, and ways to prevent chronic diseases.  Shots (vaccines)   HPV shots (up to age 26), if you've never had them before.  Hepatitis B shots (up to age 59), if you've never had them before.  COVID-19 shot: Get this shot when it's due.  Flu shot: Get a flu shot every year.  Tetanus shot: Get a tetanus shot every 10 years.  Pneumococcal, hepatitis A, and RSV shots: Ask your care team if you need these based on your risk.  Shingles shot (for age 50 and up)  General health tests  Diabetes screening:  Starting at age 35, Get screened for diabetes at least every 3 years.  If you are younger than age 35, ask your care team if you should be screened for diabetes.  Cholesterol test: At age 39, start having a cholesterol test every 5 years, or more often if advised.  Bone density scan (DEXA): At age 50, ask your care team if you should have this scan for osteoporosis (brittle bones).  Hepatitis C: Get tested at least once in your life.  STIs (sexually transmitted infections)  Before age 24: Ask your care team if you should be screened for STIs.  After age 24: Get screened for STIs if you're at risk. You are at risk for STIs (including HIV) if:  You are sexually active with more than one person.  You don't use condoms every time.  You or a partner was diagnosed with a sexually transmitted infection.  If you are at risk for HIV, ask about PrEP medicine to prevent HIV.  Get tested for HIV at least once in your life, whether you are at risk for HIV or not.  Cancer screening tests  Cervical cancer screening: If you have a cervix, begin getting regular cervical cancer screening tests starting at age 21.  Breast cancer scan (mammogram): If you've ever had breasts, begin having regular mammograms starting at age 40. This is a scan to check for breast cancer.  Colon cancer screening: It is important to start screening for colon cancer at age 45.  Have a colonoscopy test every 10 years (or more often if you're at  risk) Or, ask your provider about stool tests like a FIT test every year or Cologuard test every 3 years.  To learn more about your testing options, visit:   .  For help making a decision, visit:   https://bit.ly/um65515.  Prostate cancer screening test: If you have a prostate, ask your care team if a prostate cancer screening test (PSA) at age 55 is right for you.  Lung cancer screening: If you are a current or former smoker ages 50 to 80, ask your care team if ongoing lung cancer screenings are right for you.  For informational purposes only. Not to replace the advice of your health care provider. Copyright   2023 [employer] All rights reserved. Clinically reviewed by the [facility]. Smart Panel [id number] - REV 01/24.

## 2024-08-20 NOTE — RESULT ENCOUNTER NOTE
Hello -    Here are my comments about your recent results:  -Normal red blood cell (hgb) levels, normal white blood cell count and normal platelet levels.  MCV portion remains elevated as does the MCH which suggest some deficiency contributing to the red blood cell will see what the B12 level comes back  -A1C (diabetic test) is normal and indicates that your blood sugar has been in a normal range the last 3 months.  For additional lab test information, labtestsonline.org is an excellent reference..    Please let us know if you have any questions or concerns.     Regards,  Little Muir MD

## 2024-08-20 NOTE — PROGRESS NOTES
The below note was dictated using voice recognition. Although reviewed after completion, some word and grammatical error may remain .       Preventive Care Visit  [facility]  [place] MD, Family Medicine  Aug 20, 2024      Assessment & Plan     Routine history and physical examination of adult  Seen today for preventive physical and follow-up.  Healthcare maintenance reviewed.  May bring us a copy of her healthcare directives to put in chart if desired.  Breast exam not done recent mammogram in July normal opted no exam, continue self check regularly and annual screening mammogram yearly given on hormones, and family history of breast cancer.  A paternal & maternal gm had breast cancer  post menopause in their 60's, but felt due to them being overweight & one smoked, & opted no referral to a   PAP/ HPV done at her gyn on 5/30/23 noted normal and due again in 2026 per her report.   Colon cancer screening reviewed, colonoscopy due 2029   Vaccines reviewed  Consider the new COVID vaccine and flu shot in the fall when available.   Consider Shingrex. shingles vaccines a series of 2 shots 2 to 6 months apart that can cause couple days of flu like symptoms but came sometimes also be expensive so to check with insurance and get at pharmacy if cheaper there.  Consider hepatitis B vaccination a series of 3 shots at 0, 2 and 4-month intervals.  Consider hep A vaccine  Notes will be going to [place] next year and travelling to other  countries & agreeable to getting Twinrix  # 1 today. Twinrix No. 1 given today, to get the second shot in 2 months with the nurse and the third shot 4 months after the second.  Discussed also If travelling out of the country recommend seeing the travel clinic to update appropriate shots etc  Fasting lab work today and will make further recommendations once reviewed.  Previously opted out of HIV testing, low risk and may have been previously tested  negative when having IVF in the past. No records of this.  Later labs showed normal HB A1c, CMP & TSH.      Familial HLD, elevated LpA, FH of IHD, former smoker, on HRT, seen by cardiology 5/8/23 for shortness of breath hx, HLD, FH of IHD and LpA done was positive at 75. And advised a stress echo. This was done 6/30/24 showed normal with EF of 60%. The 10-year ASCVD risk score (Zoë DK, et al., 2019) is: 1.5%  Familial hyperlipidemia overall cardiovascular risk is low, does have family history of ischemic heart disease former smoker stress echo done last year for shortness of breath with cardiology was unremarkable.  Will check fasting lipids today if LDL is elevated could consider CT calcium score to get more objective risk of coronary artery disease.  Continue with eating healthy and staying active.  Prior shortness of breath resolved  Later labs showed LDL elevated at 148. To consider doing a CT calcium score to get objective risk if would benefit from a statin medication  to lower heart attack and stroke risk. To let me know if  would like to put this order in    Former smoker: [place] snot meet criteria for ct lung cancer screening    FH of breast cancer: A paternal & maternal gm had breast cancer  post menopause in their 60's, but felt due to them being overweight & one smoked, & opted no referral to a . Reports no breast issues, declined breast exam, today noted had normal mammogram in July 2024. Encouraged yearly screening mammograms    On hormone replacement therapy Vivelle patch and Prometrium managed by her women's clinic. Small risk of blood clots stroke heart attack breast and ovarian cancer discussed. While it is safe in the first 10 years of use in menopause risk increases after that especially after 60. Plans to wean off when retires next year.     History of uterine fibroids s/p surgery in the past asymptomatic.     Asymptomatic diverticulosis & hemorrhoids seen previously on colonoscopy in  2019. Encouraged a high-fiber diet.    Hx of elevated MCV noted in 2023, will check B 12 today. Discussed limiting alcohol to 1 small drink in 24 hrs no more than 3 / week. Later labs showed Normal red blood cell (Hgb) levels, normal white blood cell count and normal platelet levels. MCV portion remains elevated as does the MCH which suggest some deficiency contributing to the red blood cell. Vitamin B 12 level is normal. Unclear reason why the MCV portion of the Red cell is elevated we will continue to monitor.    History of kidney stones in the past, no symptoms > 11 yrs, unknown kind, tries to stay well hydrated. Encouraged a low oxalate diet as that is most common cause of kidney stones.     Family history of osteoporosis, reported DEXA scan at women's clinic 4 years ago was normal.  No records available. To continue with being active.  Recommend calcium 1200 mg total a day and vitamin D 3 2000 units daily. Recheck age 65 or earlier if insurance will cover given family history of osteoporosis.     Multiple pigmented nevi and moles, history of basal cell cancer removed from face, to continue follow-up with [facility] yearly. Will schedule for this year.     Return in 1 year for preventive physical and sooner in an office visit for any new concerns  - CBC with Platelets & Differential; Future  - Comprehensive metabolic panel; Future  - Hemoglobin A1c; Future  - PRIMARY CARE FOLLOW-UP SCHEDULING; Future  - HEP A & B (TWINRIX)  - CBC with Platelets & Differential  - Comprehensive metabolic panel  - Hemoglobin A1c    Pure hypercholesterolemia  Family history of ischemic heart disease  Familial HLD, elevated LpA, FH of IHD, former smoker, on HRT, seen by cardiology 5/8/23 for shortness of breath hx, HLD, FH of IHD and LpA done was positive at 75. And advised a stress echo. This was done 6/30/24 showed normal with EF of 60%. The 10-year ASCVD risk score (Zoë DK, et al., 2019) is: 1.5%  Familial hyperlipidemia  overall cardiovascular risk is low, does have family history of ischemic heart disease former smoker stress echo done last year for shortness of breath with cardiology was unremarkable.  Will check fasting lipids today if LDL is elevated could consider CT calcium score to get more objective risk of coronary artery disease.  Continue with eating healthy and staying active.  Prior shortness of breath resolved  Later labs showed LDL elevated at 148. To consider doing a CT calcium score to get objective risk if would benefit from a statin medication  to lower heart attack and stroke risk. To let me know if  would like to put this order in  - TSH with free T4 reflex; Future  - Lipid panel reflex to direct LDL Fasting; Future  - TSH with free T4 reflex  - Lipid panel reflex to direct LDL Fasting    Former smoker  Former smoker: [place] pined[place]ot meet criteria for ct lung cancer screening    Family history of malignant neoplasm of breast  FH of breast cancer: A paternal & maternal gm had breast cancer  post menopause in their 60's, but felt due to them being overweight & one smoked, & opted no referral to a . Reports no breast issues, declined breast exam, today noted had normal mammogram in July 2024. Encouraged yearly screening mammograms    Hormone replacement therapy (postmenopausal)  On hormone replacement therapy Vivelle patch and Prometrium managed by her women's clinic. Small risk of blood clots stroke heart attack breast and ovarian cancer discussed. While it is safe in the first 10 years of use in menopause risk increases after that especially after 60. Plans to wean off when retires next year.     History of uterine fibroid  History of uterine fibroids s/p surgery in the past asymptomatic.     Diverticulosis of large intestine without hemorrhage  Hemorrhoids, unspecified hemorrhoid type  Asymptomatic diverticulosis & hemorrhoids seen previously on colonoscopy in 2019. Encouraged a high-fiber diet.    Elevated  MCV  Hx of elevated MCV noted in 2023, will check B 12 today. Discussed limiting alcohol to 1 small drink in 24 hrs no more than 3 / week. Later labs showed Normal red blood cell (Hgb) levels, normal white blood cell count and normal platelet levels. MCV portion remains elevated as does the MCH which suggest some deficiency contributing to the red blood cell. Vitamin B 12 level is normal. Unclear reason why the MCV portion of the Red cell is elevated we will continue to monitor.  - Vitamin B 12; Future  - Vitamin B 12    History of kidney stones  History of kidney stones in the past, no symptoms > 11 yrs, unknown kind, tries to stay well hydrated. Encouraged a low oxalate diet as that is most common cause of kidney stones.     Family history of osteoporosis  Family history of osteoporosis, reported DEXA scan at women's clinic 4 years ago was normal.  No records available. To continue with being active.  Recommend calcium 1200 mg total a day and vitamin D 3 2000 units daily. Recheck age 65 or earlier if insurance will cover given family history of osteoporosis.     Multiple pigmented nevi  History of basal cell carcinoma  Multiple pigmented nevi and moles, history of basal cell cancer removed from face, to continue follow-up with [facility] yearly. Will schedule for this year.     Health care maintenance  Healthcare maintenance reviewed.  May bring us a copy of her healthcare directives to put in chart if desired.  Breast exam not done recent mammogram in July normal opted no exam, continue self check regularly and annual screening mammogram yearly given on hormones, and family history of breast cancer.  A paternal & maternal gm had breast cancer  post menopause in their 60's, but felt due to them being overweight & one smoked, & opted no referral to a   PAP/ HPV done at her gyn on 5/30/23 noted normal and due again in 2026 per her report.   Colon cancer screening reviewed, colonoscopy due 2029    Vaccines reviewed  Consider the new COVID vaccine and flu shot in the fall when available.   Consider Shingrex. shingles vaccines a series of 2 shots 2 to 6 months apart that can cause couple days of flu like symptoms but came sometimes also be expensive so to check with insurance and get at pharmacy if cheaper there.  Consider hepatitis B vaccination a series of 3 shots at 0, 2 and 4-month intervals.  Consider hep A vaccine  Notes will be going to [place] next year and travelling to other  countries & agreeable to getting Twinrix  # 1 today. Twinrix No. 1 given today, to get the second shot in 2 months with the nurse and the third shot 4 months after the second.  Discussed also If travelling out of the country recommend seeing the travel clinic to update appropriate shots etc  Fasting lab work today and will make further recommendations once reviewed.  Previously opted out of HIV testing, low risk and may have been previously tested negative when having IVF in the past. No records of this.  Later labs showed normal HB A1c, CMP & TSH.   Return in 1 year for preventive physical and sooner in an office visit for any new concerns  - REVIEW OF HEALTH MAINTENANCE PROTOCOL ORDERS    Advanced directives, counseling/discussion    Colon cancer screening  Due 2029    Need for hepatitis A and B vaccination  Twinrix No. 1 given today, to get the second shot in 2 months with the nurse and the third shot 4 months after the second.  - HEP A & B (TWINRIX)    Need for COVID-19 vaccine  Encouraged to get in the fall    Need for shingles vaccine  Consider the shingles vaccine: Shingrex is a series of 2 shots 2 to 6 months apart that can cause couple days of flu like symptoms, it can sometimes be expensive so to check with insurance and get at pharmacy if cheaper there.    Screening, anemia, deficiency, iron  - CBC with Platelets & Differential; Future  - CBC with Platelets & Differential    Screening for diabetes mellitus  -  Comprehensive metabolic panel; Future  - Hemoglobin A1c; Future  - Comprehensive metabolic panel  - Hemoglobin A1c    Patient has been advised of split billing requirements and indicates understanding: Yes    Counseling  Appropriate preventive services were addressed with this patient via screening, questionnaire, or discussion as appropriate for fall prevention, nutrition, physical activity, Tobacco-use cessation, social engagement, weight loss and cognition.  Checklist reviewing preventive services available has been given to the patient.    Regular exercise  See Patient Instructions       Subjective   Nhi is a 59 year old, presenting for the following:  Physical        8/20/2024     7:51 AM   Additional Questions   Roomed by Aura castorena   Accompanied by self        Health Care Directive  Patient does not have a Health Care Directive or Living Will: Discussed advance care planning with patient; however, patient declined at this time.    HPI      8/19/2024   General Health   How would you rate your overall physical health? Good   Feel stress (tense, anxious, or unable to sleep) Patient declined          8/19/2024   Nutrition   Three or more servings of calcium each day? Yes   Diet: Regular (no restrictions)   How many servings of fruit and vegetables per day? (!) 2-3   How many sweetened beverages each day? 0-1          8/19/2024   Exercise   Days per week of moderate/strenuous exercise 5 days   Average minutes spent exercising at this level 50 min          8/19/2024   Social Factors   Frequency of gathering with friends or relatives Three times a week   Worry food won't last until get money to buy more Patient declined   Food not last or not have enough money for food? Patient declined   Do you have housing? (Housing is defined as stable permanent housing and does not include staying outside in a car, in a tent, in an abandoned building, in an overnight shelter, or mygolach-surfing.) Patient declined   Are you worried  about losing your housing? Patient declined   Lack of transportation? Patient declined   Unable to get utilities (heat,electricity)? Patient declined          2024   Fall Risk   Fallen 2 or more times in the past year? No   Trouble with walking or balance? No             2024   Dental   Dentist two times every year? Yes          2024   TB Screening   Were you born outside of the [place]? No      Today's PHQ-2 Score:       2024     3:48 PM   PHQ-2 (  [employer])   Q1: Little interest or pleasure in doing things 0   Q2: Feeling down, depressed or hopeless 0   PHQ-2 Score 0   Q1: Little interest or pleasure in doing things Not at all   Q2: Feeling down, depressed or hopeless Not at all   PHQ-2 Score 0         2024   Substance Use   Alcohol more than 3/day or more than 7/wk No   Do you use any other substances recreationally? No      Social History     Tobacco Use    Smoking status: Former     Current packs/day: 0.00     Average packs/day: 0.3 packs/day for 16.0 years (4.0 ttl pk-yrs)     Types: Cigarettes     Start date: 1984     Quit date: 2000     Years since quittin.6    Smokeless tobacco: Never   Vaping Use    Vaping status: Never Used   Substance Use Topics    Alcohol use: Yes     Comment: about 3 to 4 glasses a week    Drug use: No           2024   LAST FHS-7 RESULTS   1st degree relative breast or ovarian cancer Yes   Any relative bilateral breast cancer No   Any male have breast cancer No   Any ONE woman have BOTH breast AND ovarian cancer No   Any woman with breast cancer before 50 yrs No   2 or more relatives with breast AND/OR ovarian cancer No   2 or more relatives with breast AND/OR bowel cancer No        Mammogram Screening - Mammogram every 1-2 years updated in Health Maintenance based on mutual decision making        2024   STI Screening   New sexual partner(s) since last STI/HIV test? No        History of abnormal Pap smear: No - age 30- 64 PAP with HPV every 5  years recommended        Latest Ref Rng & Units 2023     1:09 PM 2016    12:00 AM   PAP / HPV   PAP  Negative for Intraepithelial Lesion or Malignancy (NILM)     PAP (Historical) Negative  Negative           This result is from an external source.     ASCVD Risk   The 10-year ASCVD risk score (Zoë ELY, et al., 2019) is: 1.5%    Values used to calculate the score:      Age: 59 years      Sex: Female      Is Non- : No      Diabetic: No      Tobacco smoker: No      Systolic Blood Pressure: 108 mmHg      Is BP treated: No      HDL Cholesterol: 92 mg/dL      Total Cholesterol: 241 mg/dL    Fracture Risk Assessment Tool  Link to Frax Calculator  Use the information below to complete the Frax calculator  : 1965  Sex: female  Weight (kg): 56.6 kg (actual weight)  Height (cm): 156.5 cm  Previous Fragility Fracture:  No  History of parent with fractured hip:  No  Current Smoking:  No  Patient has been on glucocorticoids for more than 3 months (5mg/day or more): No  Rheumatoid Arthritis on Problem List:  No  Secondary Osteoporosis on Problem List:  No  Consumes 3 or more units of alcohol per day: No  Femoral Neck BMD (g/cm2)           Reviewed and updated as needed this visit by Provider   Tobacco  Allergies  Meds  Problems  Med Hx  Surg Hx             Past Medical History:   Diagnosis Date    CARDIOVASCULAR SCREENING; LDL GOAL LESS THAN 160 2013    Dysfunctional uterine bleeding 2023    Encounter for artificial insemination     with own partner    History of in vitro fertilization     x 1    History of kidney stones      Past Surgical History:   Procedure Laterality Date    AS KNEE SCOPE,MED/LAT MENISCUS REPAIR Right 2008    meniscus    ROTATOR CUFF REPAIR RT/LT Right 2013    UTERINE FIBROID SURGERY       OB History    Para Term  AB Living   0 0 0 0 0 0   SAB IAB Ectopic Multiple Live Births   0 0 0 0 0     Lab work is in process  Labs reviewed  in EPIC  BP Readings from Last 3 Encounters:   24 108/72   23 104/62   23 102/70    Wt Readings from Last 3 Encounters:   24 56.6 kg (124 lb 11.2 oz)   23 57.2 kg (126 lb 3.2 oz)   23 58.4 kg (128 lb 12.8 oz)                  Patient Active Problem List   Diagnosis    Diverticulosis of large intestine without hemorrhage    Hemorrhoids, unspecified hemorrhoid type    Family history of osteoporosis    Former smoker    Family history of ischemic heart disease    Hormone replacement therapy (postmenopausal)    History of kidney stones    Pure hypercholesterolemia    Family history of malignant neoplasm of breast    Elevated MCV    History of uterine fibroid    Multiple pigmented nevi    History of basal cell carcinoma     Past Surgical History:   Procedure Laterality Date    AS KNEE SCOPE,MED/LAT MENISCUS REPAIR Right 2008    meniscus    ROTATOR CUFF REPAIR RT/LT Right 2013    UTERINE FIBROID SURGERY         Social History     Tobacco Use    Smoking status: Former     Current packs/day: 0.00     Average packs/day: 0.3 packs/day for 16.0 years (4.0 ttl pk-yrs)     Types: Cigarettes     Start date: 1984     Quit date: 2000     Years since quittin.6    Smokeless tobacco: Never   Substance Use Topics    Alcohol use: Yes     Comment: about 3 to 4 glasses a week     Family History   Problem Relation Age of Onset    Heart Disease Mother          80 mi    Heart Disease Father     CABG Father 58    Hyperlipidemia Father     Osteoporosis Sister     Cerebrovascular Disease Paternal Grandmother     Heart Disease Paternal Grandmother     Breast Cancer Maternal Aunt     Breast Cancer Paternal Aunt          Current Outpatient Medications   Medication Sig Dispense Refill    estradiol (VIVELLE-DOT) 0.0375 MG/24HR BIW patch APPLY 1 PATCH TRANSDERMALLY 2 TIMES WEEKLY      Multiple Vitamins-Minerals (MULTIVITAMIN ADULTS PO)       progesterone (PROMETRIUM) 100 MG capsule Take 100 mg by  mouth At Bedtime       Allergies   Allergen Reactions    Pcn [Penicillin G]      hives     Recent Labs   Lab Test 23  0830 10/10/17  1422   A1C 5.6 5.3   * 96   HDL 92 112   TRIG 65 121   ALT 11  --    CR 0.71  --    GFRESTIMATED >90  --    POTASSIUM 3.8  --    TSH 2.18  --       BACKGROUND  59 yr  old lady,  , Former smoker, with HLD familial, LPA elevated at 75 in , FH of IHD, on HRT, on Vivelle-Dot and Prometrium by an outside provider, history of DUB, hx of fibroids, s/p surgery in past, FH of breast cancer, diverticulosis, hemorrhoids, seen on Colonoscopy last done  when polyps removed were benign tissue and advised recheck due in , elevated MCV, hx of kidney stones, FH of osteoporosis, multiple pigmented nevi, hx of BCC, under care of derm, with history of allergy to penicillin,   Seen previously only for acute care last by Celsa Barragan on 2022 for bronchitis and treated with Tessalon prednisone and azithromycin.      Seen first time by this provider 3/30/23 for preventive health and additional concerns. Advised self breast check regularly. Noted FH of breast cancer & discussed to consider referral to a  to assess personal risk. Was to get her Mammogram at the women's clinic/ gyn who managed her HRT. Pelvic / PAP / HPV reported normal at Gyn in , to get us records. Noted was on hormone replacement therapy Vivelle patch and Prometrium managed by women's clinic. Advised she discuss her family history and reported shortness of breath with her OB women's health provider.  Discussed small risk of blood clots stroke heart attack breast and ovarian cancer on HRT. Encouraged yearly mammograms. History of uterine fibroids s/p surgery in the past asymptomatic.  She reported shortness of breath on waking up and start of exercise but not with exercise. Had no symptoms at visit. Lungs clear, cxr not done. Possibly related to being a former smoker. Was referred to cardiology  given family history of heart disease to assess. Lung function test also ordered. Did not meet criteria for ct lung cancer screening.  Had asymptomatic diverticulosis & hemorrhoids and to continue with high-fiber diet.  History of kidney stones in the past unknown kind, asymptomatic > 10 yrs, advised to decrease oxalate in diet and increase water intake.  Family history of osteoporosis reported DEXA scan at women's clinic 3 years prior had been normal. To continue with being active. Recommended calcium 1200 mg total a day and vitamin D 3 2000 units daily.   Multiple pigmented nevi and moles history of basal cell cancer removed from face to continue follow-up with [facility] yearly.  Health care maintenance reviewed. Discussed could bring us a copy of  her healthcare directives to put in her chart if desired. Colon cancer screening colonoscopy due 2029. Declined the flu shot. To consider hepatitis B vaccination a series of 3 shots at 0, 2 and 4-month intervals.  To consider the COVID booster & Shingrex. If travelling out of the country recommended seeing the travel clinic to update appropriate shots etc. Labs done. She opted out of HIV testing.  Low risk and may have been previously tested when having IVF in the past. Was to return in 1 year for preventive physical and sooner in an office visit for any new concern    Labs showed elevated LDL, ASCVD risk of 1/1 %. Normal CMP, TSH & Hep C. Cbc showed elevated MCV.   Seen by cardiology 5/8/23 for shortness of breath hx, HLD, FH of IHD and LpA done was positive at 75. And advised a stress echo. This was done 6/30/24 showed normal with EF of 60%. Noted notes from gyn that pap done 5/30/24 was normal and mammogram done 7/2/24 was normal. [place]  negative.     CURRENTLY  Here for  preventive health and follow up. Last seen first time in 3/2023   Health care maintenance reviewed   Discussed could bring us a copy of her healthcare directives to put in  chart if  desired.  No breast issues, declined breast exam, noted had Mammogram in July 2024 was normal. Encouraged yearly screening mammograms  A paternal & maternal gm had breast cancer  post menopause in their 60's, but felt due to them being overweight & one smoked, & opted no referral to a   PAP/ HPV done at her gyn on 5/30/23 noted normal and due again in 2026 per her report.   Colon cancer screening reviewed, colonoscopy due 2029   Vaccines reviewed  Consider the new COVID vaccine and flu shot I the fall when available.   Consider Shingrex. shingles vaccines a series of 2 shots 2 to 6 months apart that can cause couple days of flu like symptoms but came sometimes also be expensive so to check with insurance and get at pharmacy if cheaper there.  Consider hepatitis B vaccination a series of 3 shots at 0, 2 and 4-month intervals.  Consider hep A vaccine  Notes will be going to [place] next [place] and travelling to other  countries & agreeable to getting Twinrix  # 1 today.   Discussed also If travelling out of the country recommend seeing the travel clinic to update appropriate shots etc  Will do labs today and will make further recommendations once reviewed.previously opted out of HIV testing, low risk and may have been previously tested negative when having IVF in the past. No records of this.     Familial HLD, elevated LpA, FH of IHD, former smoker, on HRT, seen by cardiology 5/8/23 for shortness of breath hx, HLD, FH of IHD and LpA done was positive at 75. And advised a stress echo. This was done 6/30/24 showed normal with EF of 60%.   The 10-year ASCVD risk score (Zoë DK, et al., 2019) is: 1.5%    Values used to calculate the score:      Age: 59 years      Sex: Female      Is Non- : No      Diabetic: No      Tobacco smoker: No      Systolic Blood Pressure: 108 mmHg      Is BP treated: No      HDL Cholesterol: 92 mg/dL      Total Cholesterol: 241 mg/dL  Prior noted shortness  "of breath in 2023 reports resolved. Did not do PFT's. Will check lipids today     Former smoker: [place] snot meet criteria for ct lung cancer screening    FH of breast cancer: A paternal & maternal gm had breast cancer  post menopause in their 60's, but felt due to them being overweight & one smoked, & opted no referral to a . Reports no breast issues, declined breast exam, today noted had normal mammogram in July 2024. Encouraged yearly screening mammograms    On hormone replacement therapy Vivelle patch and Prometrium managed by her women's clinic. Small risk of blood clots stroke heart attack breast and ovarian cancer discussed. Plans to wean off when retires next year.     History of uterine fibroids s/p surgery in the past asymptomatic.     Asymptomatic diverticulosis & hemorrhoids seen previously on colonoscopy in 2019. Encouraged a high-fiber diet.    Hx of elevated MCV noted in 0223, will check B 12 today. Discussed limiting alcohol to 1 small drink in 24 hrs no more than 3 / week.     History of kidney stones in the past, no symptoms > 11 yrs, unknown kind, tries to stay well hydrated.    Family history of osteoporosis reported DEXA scan [facility] 4 years ago was normal.  No records available. To continue with being active.  Recommend calcium 1200 mg total a day and vitamin D 3 2000 units daily. Will wait to get next DEXA age 66    Multiple pigmented nevi and moles, history of basal cell cancer removed from face, to continue follow-up with [facility] yearly.    Review of Systems  Constitutional, HEENT, cardiovascular, pulmonary, GI, , musculoskeletal, neuro, skin, endocrine and psych systems are negative, except as otherwise noted.     Objective    Exam  /72 (BP Location: Right arm, Patient Position: Sitting, Cuff Size: Adult Regular)   Pulse 74   Temp 98  F (36.7  C) (Temporal)   Resp 16   Ht 1.565 m (5' 1.6\")   Wt 56.6 kg (124 lb 11.2 oz)   SpO2 99%   BMI 23.11 kg/m   " "  Estimated body mass index is 23.11 kg/m  as calculated from the following:    Height as of this encounter: 1.565 m (5' 1.6\").    Weight as of this encounter: 56.6 kg (124 lb 11.2 oz).    Physical Exam  GENERAL: alert and no distress  EYES: Eyes grossly normal to inspection, PERRL and conjunctivae and sclerae normal, glasses  HENT: ear canals and TM's normal, nose and mouth without ulcers or lesions  NECK: no adenopathy, no asymmetry, masses, or scars  RESP: lungs clear to auscultation - no rales, rhonchi or wheezes  CV: regular rate and rhythm, normal S1 S2, no S3 or S4, no murmur, click or rub, no peripheral edema  ABDOMEN: soft, non tender, no hepatosplenomegaly, no masses and bowel sounds normal  MS: no gross musculoskeletal defects noted, no edema  SKIN: no suspicious lesions or rashes  NEURO: Normal strength and tone, mentation intact and speech normal  PSYCH: mentation appears normal, affect normal/bright      Signed Electronically by: Little Muir MD  "

## 2024-10-25 ENCOUNTER — ALLIED HEALTH/NURSE VISIT (OUTPATIENT)
Dept: FAMILY MEDICINE | Facility: CLINIC | Age: 59
End: 2024-10-25
Payer: COMMERCIAL

## 2024-10-25 DIAGNOSIS — Z23 NEED FOR HEPATITIS A AND B VACCINATION: Primary | ICD-10-CM

## 2024-10-25 PROCEDURE — 99207 PR NO CHARGE NURSE ONLY: CPT

## 2024-10-25 PROCEDURE — 90471 IMMUNIZATION ADMIN: CPT

## 2024-10-25 PROCEDURE — 90636 HEP A/HEP B VACC ADULT IM: CPT

## 2025-03-17 ENCOUNTER — OFFICE VISIT (OUTPATIENT)
Dept: CARDIOLOGY | Facility: CLINIC | Age: 60
End: 2025-03-17
Payer: COMMERCIAL

## 2025-03-17 VITALS
RESPIRATION RATE: 16 BRPM | DIASTOLIC BLOOD PRESSURE: 73 MMHG | SYSTOLIC BLOOD PRESSURE: 112 MMHG | BODY MASS INDEX: 24.17 KG/M2 | OXYGEN SATURATION: 100 % | HEART RATE: 75 BPM | HEIGHT: 61 IN | WEIGHT: 128 LBS

## 2025-03-17 DIAGNOSIS — Z82.49 FAMILY HISTORY OF ISCHEMIC HEART DISEASE: ICD-10-CM

## 2025-03-17 DIAGNOSIS — E78.00 PURE HYPERCHOLESTEROLEMIA: Primary | ICD-10-CM

## 2025-03-17 PROCEDURE — 3078F DIAST BP <80 MM HG: CPT | Performed by: INTERNAL MEDICINE

## 2025-03-17 PROCEDURE — 3074F SYST BP LT 130 MM HG: CPT | Performed by: INTERNAL MEDICINE

## 2025-03-17 PROCEDURE — 99214 OFFICE O/P EST MOD 30 MIN: CPT | Performed by: INTERNAL MEDICINE

## 2025-03-17 PROCEDURE — G2211 COMPLEX E/M VISIT ADD ON: HCPCS | Performed by: INTERNAL MEDICINE

## 2025-03-17 NOTE — LETTER
3/17/2025    Little Muir MD  [address]  [place] [address]    RE: Nhi Perla       Dear Colleague,     I had the pleasure of seeing Nhi Perla in the [facility].      [facility]  Heart Care Clinic Follow-up Note    Assessment & Plan        (E78.00) Pure hypercholesterolemia  (primary encounter diagnosis)  Comment: Significantly elevated cholesterol 252 with an LDL of 148.  Her father had high cholesterol and cholesteatoma and she has no physical signs or symptoms.  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  If does not come down with atorvastatin or not tolerated will consider trying rosuvastatin 10 mg or then potentially PCSK9 inhibitor or bempedoic acid.    (Z82.49) Family history of ischemic heart disease  Comment: Father had bypass at a relatively young age of 58, he had cholesteatomas, hypercholesterolemia, very concerned about hypercholesterolemia familial or possibly probably genetic hypercholesterolemia and check calcium score and consider adding statin if anything but 0.    Plan  1.  Check calcium score and if elevated start either atorvastatin 20 mg or rosuvastatin 10 mg.  2.  If not tolerated consider PCSK9 inhibitor or bempedoic acid.  3.  Follow-up as needed.    The longitudinal plan of care for the diagnosis(es)/condition(s) as documented were addressed during this visit. Due to the added complexity in care, I will continue to support Nhi in the subsequent management and with ongoing continuity of care.     Subjective  CC: 59-year-old white female being seen in follow-up.  Since have seen her she is still playing doubles tennis, occasional singles tennis.  Still working out and doing tennis drills.  Has no complaints but is here today to discuss lipid management, her primary  "would like to start her on a statin. Patient complains of no syncope, dizziness, fatigue, fevers, chest pain, palpitations, shortness of breath, PND, orthopnea, nausea, vomiting, or edema.     Medications  Current Outpatient Medications   Medication Sig Dispense Refill     estradiol (VIVELLE-DOT) 0.0375 MG/24HR BIW patch APPLY 1 PATCH TRANSDERMALLY 2 TIMES WEEKLY       Multiple Vitamins-Minerals (MULTIVITAMIN ADULTS PO)        progesterone (PROMETRIUM) 100 MG capsule Take 100 mg by mouth At Bedtime         Objective  /73 (BP Location: Left arm, Patient Position: Sitting, Cuff Size: Adult Regular)   Pulse 75   Resp 16   Ht 1.549 m (5' 1\")   Wt 58.1 kg (128 lb)   LMP 08/09/2016 (Exact Date)   SpO2 100%   BMI 24.19 kg/m      General Appearance:    Alert, cooperative, no distress, appears stated age   Head:    Normocephalic, without obvious abnormality, atraumatic   Throat:   Lips, mucosa, and tongue normal; teeth and gums normal   Neck:   Supple, symmetrical, trachea midline, no adenopathy;        thyroid:  No enlargement/tenderness/nodules; no carotid    bruit or JVD   Back:     Symmetric, no curvature, ROM normal, no CVA tenderness   Lungs:     Clear to auscultation bilaterally, respirations unlabored   Chest wall:    No tenderness or deformity   Heart:    Regular rate and rhythm, S1 and S2 normal, no murmur, rub   or gallop   Abdomen:     Soft, non-tender, bowel sounds active all four quadrants,     no masses, no organomegaly   Extremities:   Normal, atraumatic, no cyanosis or edema   Pulses:   2+ and symmetric all extremities   Skin:   Skin color, texture, turgor normal, no rashes or lesions     Results    Lab Results personally reviewed   Lab Results   Component Value Date    CHOL 252 (H) 08/20/2024    CHOL 241 (H) 03/30/2023     Lab Results   Component Value Date    HDL 87 08/20/2024    HDL 92 03/30/2023     No components found for: \"LDLCALC\"  Lab Results   Component Value Date    TRIG 85 08/20/2024 "    TRIG 65 03/30/2023     Lab Results   Component Value Date    WBC 5.3 08/20/2024    HGB 13.2 08/20/2024    HCT 40.6 08/20/2024     08/20/2024     Lab Results   Component Value Date    BUN 13.0 08/20/2024     08/20/2024    CO2 26 08/20/2024               Thank you for allowing me to participate in the care of your patient.      Sincerely,     HARMAN HSIEH MD     [facility]  cc:   Gaby Hsieh MD  [address], [address]  [place] [address]

## 2025-03-17 NOTE — PATIENT INSTRUCTIONS
Ms Nhi Perla,  I enjoyed visiting with you again today.  I am glad to hear you are doing well.  Per our conversation let us get the calcium or heart score and get you results and if not 0 would suggest starting a statin.  I will plan on seeing you thereafter if needed.  Carlton Hsieh

## 2025-03-17 NOTE — PROGRESS NOTES
[facility]  Heart Care Clinic Follow-up Note    Assessment & Plan        (E78.00) Pure hypercholesterolemia  (primary encounter diagnosis)  Comment: Significantly elevated cholesterol 252 with an LDL of 148.  Her father had high cholesterol and cholesteatoma and she has no physical signs or symptoms.  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  If does not come down with atorvastatin or not tolerated will consider trying rosuvastatin 10 mg or then potentially PCSK9 inhibitor or bempedoic acid.    (Z82.49) Family history of ischemic heart disease  Comment: Father had bypass at a relatively young age of 58, he had cholesteatomas, hypercholesterolemia, very concerned about hypercholesterolemia familial or possibly probably genetic hypercholesterolemia and check calcium score and consider adding statin if anything but 0.    Plan  1.  Check calcium score and if elevated start either atorvastatin 20 mg or rosuvastatin 10 mg.  2.  If not tolerated consider PCSK9 inhibitor or bempedoic acid.  3.  Follow-up as needed.    The longitudinal plan of care for the diagnosis(es)/condition(s) as documented were addressed during this visit. Due to the added complexity in care, I will continue to support Nhi in the subsequent management and with ongoing continuity of care.     Subjective  CC: 59-year-old white female being seen in follow-up.  Since have seen her she is still playing doubles tennis, occasional singles tennis.  Still working out and doing tennis drills.  Has no complaints but is here today to discuss lipid management, her primary would like to start her on a statin. Patient complains of no syncope, dizziness, fatigue, fevers, chest pain, palpitations, shortness of breath, PND, orthopnea, nausea, vomiting, or edema.  "    Medications  Current Outpatient Medications   Medication Sig Dispense Refill    estradiol (VIVELLE-DOT) 0.0375 MG/24HR BIW patch APPLY 1 PATCH TRANSDERMALLY 2 TIMES WEEKLY      Multiple Vitamins-Minerals (MULTIVITAMIN ADULTS PO)       progesterone (PROMETRIUM) 100 MG capsule Take 100 mg by mouth At Bedtime         Objective  /73 (BP Location: Left arm, Patient Position: Sitting, Cuff Size: Adult Regular)   Pulse 75   Resp 16   Ht 1.549 m (5' 1\")   Wt 58.1 kg (128 lb)   LMP 08/09/2016 (Exact Date)   SpO2 100%   BMI 24.19 kg/m      General Appearance:    Alert, cooperative, no distress, appears stated age   Head:    Normocephalic, without obvious abnormality, atraumatic   Throat:   Lips, mucosa, and tongue normal; teeth and gums normal   Neck:   Supple, symmetrical, trachea midline, no adenopathy;        thyroid:  No enlargement/tenderness/nodules; no carotid    bruit or JVD   Back:     Symmetric, no curvature, ROM normal, no CVA tenderness   Lungs:     Clear to auscultation bilaterally, respirations unlabored   Chest wall:    No tenderness or deformity   Heart:    Regular rate and rhythm, S1 and S2 normal, no murmur, rub   or gallop   Abdomen:     Soft, non-tender, bowel sounds active all four quadrants,     no masses, no organomegaly   Extremities:   Normal, atraumatic, no cyanosis or edema   Pulses:   2+ and symmetric all extremities   Skin:   Skin color, texture, turgor normal, no rashes or lesions     Results    Lab Results personally reviewed   Lab Results   Component Value Date    CHOL 252 (H) 08/20/2024    CHOL 241 (H) 03/30/2023     Lab Results   Component Value Date    HDL 87 08/20/2024    HDL 92 03/30/2023     No components found for: \"LDLCALC\"  Lab Results   Component Value Date    TRIG 85 08/20/2024    TRIG 65 03/30/2023     Lab Results   Component Value Date    WBC 5.3 08/20/2024    HGB 13.2 08/20/2024    HCT 40.6 08/20/2024     08/20/2024     Lab Results   Component Value Date    " BUN 13.0 08/20/2024     08/20/2024    CO2 26 08/20/2024

## 2025-04-11 ENCOUNTER — HOSPITAL ENCOUNTER (OUTPATIENT)
Dept: CT IMAGING | Facility: CLINIC | Age: 60
Discharge: HOME | End: 2025-04-11
Attending: INTERNAL MEDICINE | Admitting: INTERNAL MEDICINE
Payer: COMMERCIAL

## 2025-04-11 DIAGNOSIS — Z82.49 FAMILY HISTORY OF ISCHEMIC HEART DISEASE: ICD-10-CM

## 2025-04-11 LAB
CV CALCIUM SCORE AGATSTON LM: 0
CV CALCIUM SCORING AGATSON LAD: 82
CV CALCIUM SCORING AGATSTON CX: 0
CV CALCIUM SCORING AGATSTON RCA: 0
CV CALCIUM SCORING AGATSTON TOTAL: 82

## 2025-04-11 PROCEDURE — 75571 CT HRT W/O DYE W/CA TEST: CPT | Mod: 26 | Performed by: INTERNAL MEDICINE

## 2025-04-11 PROCEDURE — 75571 CT HRT W/O DYE W/CA TEST: CPT

## 2025-04-14 DIAGNOSIS — E78.5 HYPERLIPIDEMIA LDL GOAL <70: ICD-10-CM

## 2025-04-14 DIAGNOSIS — R93.1 ELEVATED CORONARY ARTERY CALCIUM SCORE: ICD-10-CM

## 2025-04-14 DIAGNOSIS — Z82.49 FAMILY HISTORY OF ISCHEMIC HEART DISEASE: Primary | ICD-10-CM

## 2025-04-14 RX ORDER — ATORVASTATIN CALCIUM 10 MG/1
10 TABLET, FILM COATED ORAL DAILY
Qty: 90 TABLET | Refills: 1 | Status: SHIPPED | OUTPATIENT
Start: 2025-04-14

## 2025-07-14 ENCOUNTER — LAB (OUTPATIENT)
Dept: CARDIOLOGY | Facility: CLINIC | Age: 60
End: 2025-07-14
Payer: COMMERCIAL

## 2025-07-14 DIAGNOSIS — E78.5 HYPERLIPIDEMIA LDL GOAL <70: ICD-10-CM

## 2025-07-14 DIAGNOSIS — Z82.49 FAMILY HISTORY OF ISCHEMIC HEART DISEASE: ICD-10-CM

## 2025-07-14 DIAGNOSIS — R93.1 ELEVATED CORONARY ARTERY CALCIUM SCORE: ICD-10-CM

## 2025-07-14 LAB
ALBUMIN SERPL BCG-MCNC: 4.2 G/DL (ref 3.5–5.2)
ALP SERPL-CCNC: 42 U/L (ref 40–150)
ALT SERPL W P-5'-P-CCNC: 27 U/L (ref 0–50)
AST SERPL W P-5'-P-CCNC: 19 U/L (ref 0–45)
BILIRUB DIRECT SERPL-MCNC: 0.18 MG/DL (ref 0–0.3)
BILIRUB SERPL-MCNC: 0.5 MG/DL
CHOLEST SERPL-MCNC: 188 MG/DL
FASTING STATUS PATIENT QL REPORTED: YES
HDLC SERPL-MCNC: 99 MG/DL
LDLC SERPL CALC-MCNC: 75 MG/DL
NONHDLC SERPL-MCNC: 89 MG/DL
PROT SERPL-MCNC: 7 G/DL (ref 6.4–8.3)
TRIGL SERPL-MCNC: 70 MG/DL

## 2025-07-14 PROCEDURE — 36415 COLL VENOUS BLD VENIPUNCTURE: CPT

## 2025-07-14 PROCEDURE — 80061 LIPID PANEL: CPT

## 2025-07-14 PROCEDURE — 80076 HEPATIC FUNCTION PANEL: CPT

## 2025-07-14 PROCEDURE — 3048F LDL-C <100 MG/DL: CPT

## 2025-07-15 ENCOUNTER — ANCILLARY PROCEDURE (OUTPATIENT)
Dept: MAMMOGRAPHY | Facility: HOSPITAL | Age: 60
End: 2025-07-15
Attending: FAMILY MEDICINE
Payer: COMMERCIAL

## 2025-07-15 DIAGNOSIS — Z12.31 ENCOUNTER FOR SCREENING MAMMOGRAM FOR MALIGNANT NEOPLASM OF BREAST: ICD-10-CM

## 2025-07-15 PROCEDURE — 77063 BREAST TOMOSYNTHESIS BI: CPT
